# Patient Record
Sex: MALE | Race: WHITE | NOT HISPANIC OR LATINO | Employment: OTHER | URBAN - NONMETROPOLITAN AREA
[De-identification: names, ages, dates, MRNs, and addresses within clinical notes are randomized per-mention and may not be internally consistent; named-entity substitution may affect disease eponyms.]

---

## 2023-12-13 ENCOUNTER — OFFICE VISIT (OUTPATIENT)
Dept: ORTHOPEDIC SURGERY | Facility: CLINIC | Age: 63
End: 2023-12-13
Payer: MEDICARE

## 2023-12-13 ENCOUNTER — HOSPITAL ENCOUNTER (OUTPATIENT)
Dept: GENERAL RADIOLOGY | Facility: HOSPITAL | Age: 63
Discharge: HOME | End: 2023-12-13
Admitting: ORTHOPAEDIC SURGERY
Payer: MEDICARE

## 2023-12-13 VITALS
SYSTOLIC BLOOD PRESSURE: 104 MMHG | WEIGHT: 178 LBS | HEIGHT: 68 IN | HEART RATE: 56 BPM | DIASTOLIC BLOOD PRESSURE: 71 MMHG | BODY MASS INDEX: 26.98 KG/M2

## 2023-12-13 DIAGNOSIS — M24.542 CONTRACTURE OF FINGER JOINT, LEFT: Primary | ICD-10-CM

## 2023-12-13 DIAGNOSIS — M72.0 DUPUYTREN'S CONTRACTURE OF LEFT HAND: ICD-10-CM

## 2023-12-13 DIAGNOSIS — M24.542 CONTRACTURE OF FINGER JOINT, LEFT: ICD-10-CM

## 2023-12-13 PROCEDURE — 99204 OFFICE O/P NEW MOD 45 MIN: CPT | Performed by: ORTHOPAEDIC SURGERY

## 2023-12-13 PROCEDURE — 73130 X-RAY EXAM OF HAND: CPT

## 2023-12-13 RX ORDER — TRAZODONE HYDROCHLORIDE 100 MG/1
100 TABLET ORAL DAILY
COMMUNITY
Start: 2023-08-22

## 2023-12-13 RX ORDER — CARVEDILOL 6.25 MG/1
6.25 TABLET ORAL 2 TIMES DAILY
COMMUNITY

## 2023-12-13 RX ORDER — LOSARTAN POTASSIUM AND HYDROCHLOROTHIAZIDE 12.5; 1 MG/1; MG/1
TABLET ORAL
COMMUNITY

## 2023-12-13 RX ORDER — GABAPENTIN 100 MG/1
100 CAPSULE ORAL 3 TIMES DAILY
COMMUNITY

## 2023-12-13 RX ORDER — OMEPRAZOLE 20 MG/1
CAPSULE, DELAYED RELEASE ORAL
COMMUNITY
Start: 2023-11-01

## 2023-12-13 RX ORDER — LEVOTHYROXINE SODIUM 0.05 MG/1
50 TABLET ORAL DAILY
COMMUNITY
Start: 2023-08-22

## 2023-12-13 RX ORDER — ASPIRIN 81 MG/1
81 TABLET ORAL DAILY
COMMUNITY

## 2023-12-13 NOTE — H&P (VIEW-ONLY)
History and Physical      Patient: Iker Mayen  YOB: 1960  Date of Encounter: 12/13/2023      Chief Complaint:   Chief Complaint   Patient presents with    Left Hand - Contractions, Initial Evaluation     Left 5th finger           HPI:   Iker Mayen, 63 y.o. male, presents for evaluation of contracture of his left hand fifth finger.  He has been symptomatic for greater than 5 years.  He was treated at the [facility] with enzyme injection.  Reports that his contracture has returned.  He has difficulty with common daily activities because of the contracture.  Past medical history is unremarkable for  cardiovascular disease.        Active Problem List:  Patient Active Problem List   Diagnosis    Contracture of finger joint, left    Dupuytren's contracture of left hand           Past Medical History:  Past Medical History:   Diagnosis Date    Hypertension            Past Surgical History:  Past Surgical History:   Procedure Laterality Date    BACK SURGERY      CHOLECYSTECTOMY      HERNIA REPAIR             Family History:  History reviewed. No pertinent family history.        Social History:  Social History     Socioeconomic History    Marital status:    Tobacco Use    Smoking status: Former     Types: Cigarettes    Smokeless tobacco: Never   Vaping Use    Vaping Use: Never used   Substance and Sexual Activity    Alcohol use: Not Currently    Drug use: Never    Sexual activity: Defer     Body mass index is 27.06 kg/m².        Medications:  Current Outpatient Medications   Medication Sig Dispense Refill    aspirin 81 MG EC tablet Take 1 tablet by mouth Daily.      carvedilol (COREG) 6.25 MG tablet Take 1 tablet by mouth 2 (Two) Times a Day.      gabapentin (NEURONTIN) 100 MG capsule Take 1 capsule by mouth 3 (Three) Times a Day.      levothyroxine (SYNTHROID, LEVOTHROID) 50 MCG tablet Take 1 tablet by mouth Daily.      losartan-hydrochlorothiazide (HYZAAR) 100-12.5 MG per  tablet take 1 tablet by mouth 1 time each day      omeprazole (priLOSEC) 20 MG capsule       sertraline (ZOLOFT) 50 MG tablet Take 1 tablet by mouth Daily.      traZODone (DESYREL) 100 MG tablet Take 1 tablet by mouth Daily.       No current facility-administered medications for this visit.           Allergies:  Allergies   Allergen Reactions    Adhesive Tape Rash    Latex Unknown - Low Severity and Other (See Comments)     Latex tape, blisters on skin    Wound Dressing Adhesive Other (See Comments)           Review of Systems:   Review of Systems   Constitutional: Negative.  Negative for chills, fatigue and fever.   HENT: Negative.  Negative for congestion, facial swelling, mouth sores, sore throat, trouble swallowing and voice change.    Eyes: Negative.  Negative for pain, discharge and visual disturbance.   Respiratory: Negative.  Negative for apnea, cough, chest tightness, shortness of breath and wheezing.    Cardiovascular: Negative.  Negative for chest pain, palpitations and leg swelling.   Gastrointestinal: Negative.  Negative for abdominal distention, abdominal pain, anal bleeding, constipation, diarrhea, nausea and vomiting.   Endocrine: Negative.  Negative for cold intolerance, heat intolerance, polyphagia and polyuria.   Genitourinary: Negative.  Negative for difficulty urinating, dysuria, flank pain and hematuria.   Musculoskeletal:  Positive for arthralgias.   Skin: Negative.  Negative for color change, pallor, rash and wound.   Allergic/Immunologic: Negative.  Negative for environmental allergies, food allergies and immunocompromised state.   Neurological: Negative.  Negative for dizziness, tremors, seizures, syncope, facial asymmetry, speech difficulty, weakness, light-headedness, numbness and headaches.   Hematological: Negative.  Negative for adenopathy. Does not bruise/bleed easily.   Psychiatric/Behavioral: Negative.  Negative for behavioral problems, confusion, dysphoric mood, self-injury, sleep  "disturbance and suicidal ideas. The patient is not nervous/anxious.            Physical Exam:   Physical Exam  GENERAL: 63 y.o. male, alert and oriented X 3 in no acute distress.   Visit Vitals  /71   Pulse 56   Ht 172.7 cm (68\")   Wt 80.7 kg (178 lb)   BMI 27.06 kg/m²               Musculoskeletal Examination: Hand reveals 90 degree flexion contracture of the PIP joint with palpable cord extending into the palm.  Demonstrates normal sensation.    Demonstrates active flexion extension of all other fingers with no evidence of contracture.      Radiology/Labs:    XR Hand 3+ View Left    Result Date: 12/13/2023    Flexion at the proximal interphalangeal joint of the fifth digit but no acute fracture.   This report was finalized on 12/13/2023 9:35 AM by Dr. Surya Simon MD.           Radiographs hand obtained today and reviewed demonstrate 90 degree flexion contracture of the PIP joint of the fifth finger otherwise unremarkable.        Assessment & Plan:   63 y.o. male presents for 5-year history of contracture left fifth finger has received enzyme injection without improvement.  He wishes to treat with surgical solution we discussed options discussed benefits and risk including the risk of damage to the fifth finger. He accepts these risks and wishes to proceed with proposed partial palmar fasciectomy and excision of the Dupuytren's disease left fifth finger he is scheduled [facility].  He will discontinue aspirin.      ICD-10-CM ICD-9-CM   1. Contracture of finger joint, left  M24.542 718.44   2. Dupuytren's contracture of left hand  M72.0 728.6           Cc:   Tri Espitia DO              This document has been electronically signed by Ankush Alexander MD   December 17, 2023 19:26 EST                "

## 2023-12-13 NOTE — PROGRESS NOTES
History and Physical      Patient: Iker Mayen  YOB: 1960  Date of Encounter: 12/13/2023      Chief Complaint:   Chief Complaint   Patient presents with    Left Hand - Contractions, Initial Evaluation     Left 5th finger           HPI:   Iker Mayen, 63 y.o. male, presents for evaluation of contracture of his left hand fifth finger.  He has been symptomatic for greater than 5 years.  He was treated at the [facility] with enzyme injection.  Reports that his contracture has returned.  He has difficulty with common daily activities because of the contracture.  Past medical history is unremarkable for  cardiovascular disease.        Active Problem List:  Patient Active Problem List   Diagnosis    Contracture of finger joint, left    Dupuytren's contracture of left hand           Past Medical History:  Past Medical History:   Diagnosis Date    Hypertension            Past Surgical History:  Past Surgical History:   Procedure Laterality Date    BACK SURGERY      CHOLECYSTECTOMY      HERNIA REPAIR             Family History:  History reviewed. No pertinent family history.        Social History:  Social History     Socioeconomic History    Marital status:    Tobacco Use    Smoking status: Former     Types: Cigarettes    Smokeless tobacco: Never   Vaping Use    Vaping Use: Never used   Substance and Sexual Activity    Alcohol use: Not Currently    Drug use: Never    Sexual activity: Defer     Body mass index is 27.06 kg/m².        Medications:  Current Outpatient Medications   Medication Sig Dispense Refill    aspirin 81 MG EC tablet Take 1 tablet by mouth Daily.      carvedilol (COREG) 6.25 MG tablet Take 1 tablet by mouth 2 (Two) Times a Day.      gabapentin (NEURONTIN) 100 MG capsule Take 1 capsule by mouth 3 (Three) Times a Day.      levothyroxine (SYNTHROID, LEVOTHROID) 50 MCG tablet Take 1 tablet by mouth Daily.      losartan-hydrochlorothiazide (HYZAAR) 100-12.5 MG per  tablet take 1 tablet by mouth 1 time each day      omeprazole (priLOSEC) 20 MG capsule       sertraline (ZOLOFT) 50 MG tablet Take 1 tablet by mouth Daily.      traZODone (DESYREL) 100 MG tablet Take 1 tablet by mouth Daily.       No current facility-administered medications for this visit.           Allergies:  Allergies   Allergen Reactions    Adhesive Tape Rash    Latex Unknown - Low Severity and Other (See Comments)     Latex tape, blisters on skin    Wound Dressing Adhesive Other (See Comments)           Review of Systems:   Review of Systems   Constitutional: Negative.  Negative for chills, fatigue and fever.   HENT: Negative.  Negative for congestion, facial swelling, mouth sores, sore throat, trouble swallowing and voice change.    Eyes: Negative.  Negative for pain, discharge and visual disturbance.   Respiratory: Negative.  Negative for apnea, cough, chest tightness, shortness of breath and wheezing.    Cardiovascular: Negative.  Negative for chest pain, palpitations and leg swelling.   Gastrointestinal: Negative.  Negative for abdominal distention, abdominal pain, anal bleeding, constipation, diarrhea, nausea and vomiting.   Endocrine: Negative.  Negative for cold intolerance, heat intolerance, polyphagia and polyuria.   Genitourinary: Negative.  Negative for difficulty urinating, dysuria, flank pain and hematuria.   Musculoskeletal:  Positive for arthralgias.   Skin: Negative.  Negative for color change, pallor, rash and wound.   Allergic/Immunologic: Negative.  Negative for environmental allergies, food allergies and immunocompromised state.   Neurological: Negative.  Negative for dizziness, tremors, seizures, syncope, facial asymmetry, speech difficulty, weakness, light-headedness, numbness and headaches.   Hematological: Negative.  Negative for adenopathy. Does not bruise/bleed easily.   Psychiatric/Behavioral: Negative.  Negative for behavioral problems, confusion, dysphoric mood, self-injury, sleep  "disturbance and suicidal ideas. The patient is not nervous/anxious.            Physical Exam:   Physical Exam  GENERAL: 63 y.o. male, alert and oriented X 3 in no acute distress.   Visit Vitals  /71   Pulse 56   Ht 172.7 cm (68\")   Wt 80.7 kg (178 lb)   BMI 27.06 kg/m²               Musculoskeletal Examination: Hand reveals 90 degree flexion contracture of the PIP joint with palpable cord extending into the palm.  Demonstrates normal sensation.    Demonstrates active flexion extension of all other fingers with no evidence of contracture.      Radiology/Labs:    XR Hand 3+ View Left    Result Date: 12/13/2023    Flexion at the proximal interphalangeal joint of the fifth digit but no acute fracture.   This report was finalized on 12/13/2023 9:35 AM by Dr. Surya Simon MD.           Radiographs hand obtained today and reviewed demonstrate 90 degree flexion contracture of the PIP joint of the fifth finger otherwise unremarkable.        Assessment & Plan:   63 y.o. male presents for 5-year history of contracture left fifth finger has received enzyme injection without improvement.  He wishes to treat with surgical solution we discussed options discussed benefits and risk including the risk of damage to the fifth finger. He accepts these risks and wishes to proceed with proposed partial palmar fasciectomy and excision of the Dupuytren's disease left fifth finger he is scheduled [facility].  He will discontinue aspirin.      ICD-10-CM ICD-9-CM   1. Contracture of finger joint, left  M24.542 718.44   2. Dupuytren's contracture of left hand  M72.0 728.6           Cc:   Tri Espitia DO              This document has been electronically signed by Ankush Alexander MD   December 17, 2023 19:26 EST                "

## 2023-12-13 NOTE — PROGRESS NOTES
"New Patient Visit      Patient: Iker Mayen  YOB: 1960  Date of Encounter: 12/13/2023        Chief Complaint:   Chief Complaint   Patient presents with    Left Hand - Contractions, Initial Evaluation     Left 5th finger           HPI:   Iker Mayen, 63 y.o. male, referred by Tri Espitia DO presents        Active Problem List:  There is no problem list on file for this patient.          Past Medical History:  No past medical history on file.        Past Surgical History:  No past surgical history on file.        Family History:  No family history on file.      Social History:  Social History     Socioeconomic History    Marital status:      Body mass index is 27.06 kg/m².      Medications:  No current outpatient medications on file.     No current facility-administered medications for this visit.         Allergies:  No Known Allergies      Review of Systems:   Review of Systems      Physical Exam:   Physical Exam  GENERAL: 63 y.o. male, alert and oriented X 3 in no acute distress.   Visit Vitals  Ht 172.7 cm (68\")   Wt 80.7 kg (178 lb)   BMI 27.06 kg/m²       GENERAL APPEARANCE: Awake, alert & oriented, in no acute distress and well developed, well nourished.   PSYCH: Normal mood and affect  LUNGS: Breathing nonlabored, no wheezing  EYES: Sclera anicteric, pupils equal  CARDIOVASCULAR: Palpable pulses. Capillary refill less than 2 seconds  INTEGUMENTARY: Skin intact, co clubbing, cyanosis  NEUROLOGIC: Normal Sensation  MUSCULOSKELETAL:  Orthopedic Examination:          Radiology/Labs:     No radiology results for the last 30 days.        Radiographs          Assessment & Plan:   63 y.o. male presents      No diagnosis found.      Procedures      Cc:   Tri Espitia DO                This document has been electronically signed by Ankush Alexander MD   December 13, 2023 09:15 EST      "

## 2023-12-29 NOTE — DISCHARGE INSTRUCTIONS
1/04/23  ARRIVAL TIME PER DR OFFICE  TAKE the following medications the morning of surgery:  All heart or blood pressure medications    HOLD all diabetic medications the morning of surgery as ordered by physician.    Please discontinue all blood thinners and anticoagulants (except aspirin) prior to surgery as per your surgeon and cardiologist instructions.  Aspirin may be continued up to the day prior to surgery.     CHLORHEXIDINE CLOTHS GIVEN WITH INSTRUCTIONS AND FORM TO RETURN TO HOSPITAL, IF APPLICABLE.    General Instructions:  Do not eat or drink after midnight: includes water, mints, or gum. You may brush your teeth.  Dental appliances that are removable must be taken out day of surgery.  Do not smoke, chew tobacco, or drink alcohol.  Bring medications in original bottles, any inhalers and if applicable your C-PAP/BI-PAP machine.  Bring any papers given to you in the doctor's office.  Wear clean comfortable clothes and socks.  Do not wear contact lenses or make-up. Bring a case for your glasses if applicable.  Bring crutches or walker if applicable.  Leave all other valuables and jewelry at home.    If you were given a blood bank ID arm band remember to bring it with you the day of surgery.    Preventing a Surgical Site Infection:  Shower the night before surgery (unless instructed other wise) using a fresh bar of anti-bacterial soap (such as Dial) and clean washcloth. Dry with a clean towel and dress in clean clothing.  For 2 to 3 days before surgery, avoid shaving with a razor near where you will have surgery because the razor can irritate skin and make it easier to develop an infection. Ask your surgeon if you will be receiving antibiotics prior to surgery.  Make sure you, your family, and all healthcare providers clear their hands with soap and water or an alcohol-based hand  before caring for you or your wound.  If at all possible, quit smoking as many days before surgery as you can.    Day of  surgery:  Upon arrival, a Pre-op nurse and Anesthesiologist will review your health history, obtain vital signs, and answer questions you may have. The only belongings needed at this time will be your home medications and if applicable your C-PAP/BI-PAP machine. If you are staying overnight your family can leave the rest of your belongings in the car and bring them to your room later. A Pre-op nurse will start an IV and you may receive medication in preparation for surgery, including something to help you relax. Your family will be able to see you in the Pre-op area. While you are in surgery your family should notify the waiting room  if they leave the waiting room area and provide a contact phone number.    Please be aware that surgery does come with discomfort. We want to make every effort to control your discomfort so please discuss any uncontrolled symptoms with your nurse. Your doctor will most likely have prescribed pain medications.    If you are going home after surgery you will receive individualized written care instructions before being discharged. A responsible adult must drive you to and from the hospital on the day of surgery and stay with you for 24 hours.    If you are staying overnight following surgery, you will be transported to your hospital room following the recovery period.  [facility] has all private rooms.    If you have any questions please call Pre-Admission Testing at [phone].  Deductibles and co-payments are collected on the day of service. Please be prepared to pay the required co-pay, deductible or deposit on the day of service as defined by your plan.    A RESPONSIBLE PERSON MUST REMAIN IN THE WAITING ROOM DURING YOUR PROCEDURE AND A RESPONSIBLE  MUST BE AVAILABLE UPON YOUR DISCHARGE.

## 2024-01-02 ENCOUNTER — PRE-ADMISSION TESTING (OUTPATIENT)
Dept: PREADMISSION TESTING | Facility: HOSPITAL | Age: 64
End: 2024-01-02
Payer: MEDICARE

## 2024-01-02 DIAGNOSIS — M24.542 CONTRACTURE OF FINGER JOINT, LEFT: ICD-10-CM

## 2024-01-02 DIAGNOSIS — M72.0 DUPUYTREN'S CONTRACTURE OF LEFT HAND: ICD-10-CM

## 2024-01-02 LAB
ANION GAP SERPL CALCULATED.3IONS-SCNC: 8.3 MMOL/L (ref 5–15)
BUN SERPL-MCNC: 11 MG/DL (ref 8–23)
BUN/CREAT SERPL: 11.3 (ref 7–25)
CALCIUM SPEC-SCNC: 9.4 MG/DL (ref 8.6–10.5)
CHLORIDE SERPL-SCNC: 103 MMOL/L (ref 98–107)
CO2 SERPL-SCNC: 27.7 MMOL/L (ref 22–29)
CREAT SERPL-MCNC: 0.97 MG/DL (ref 0.76–1.27)
DEPRECATED RDW RBC AUTO: 45.4 FL (ref 37–54)
EGFRCR SERPLBLD CKD-EPI 2021: 87.7 ML/MIN/1.73
ERYTHROCYTE [DISTWIDTH] IN BLOOD BY AUTOMATED COUNT: 13 % (ref 12.3–15.4)
GLUCOSE SERPL-MCNC: 101 MG/DL (ref 65–99)
HCT VFR BLD AUTO: 40.6 % (ref 37.5–51)
HGB BLD-MCNC: 12.6 G/DL (ref 13–17.7)
MCH RBC QN AUTO: 29.8 PG (ref 26.6–33)
MCHC RBC AUTO-ENTMCNC: 31 G/DL (ref 31.5–35.7)
MCV RBC AUTO: 96 FL (ref 79–97)
PLATELET # BLD AUTO: 194 10*3/MM3 (ref 140–450)
PMV BLD AUTO: 12.6 FL (ref 6–12)
POTASSIUM SERPL-SCNC: 4.6 MMOL/L (ref 3.5–5.2)
RBC # BLD AUTO: 4.23 10*6/MM3 (ref 4.14–5.8)
SODIUM SERPL-SCNC: 139 MMOL/L (ref 136–145)
WBC NRBC COR # BLD AUTO: 8.45 10*3/MM3 (ref 3.4–10.8)

## 2024-01-02 PROCEDURE — 85027 COMPLETE CBC AUTOMATED: CPT

## 2024-01-02 PROCEDURE — 80048 BASIC METABOLIC PNL TOTAL CA: CPT

## 2024-01-02 PROCEDURE — 36415 COLL VENOUS BLD VENIPUNCTURE: CPT

## 2024-01-03 ENCOUNTER — TELEPHONE (OUTPATIENT)
Dept: ORTHOPEDIC SURGERY | Facility: CLINIC | Age: 64
End: 2024-01-03
Payer: MEDICARE

## 2024-01-03 NOTE — TELEPHONE ENCOUNTER
Provider: CAROLINE    Caller: AURELIA    Relationship to Patient: SELF    Pharmacy:     Phone Number: [phone]    Reason for Call: PT CALLING TO CONFIRM HIS SX TIME TOMORROW 1/4/23 - ATTEMPTED TO [facility]

## 2024-01-04 ENCOUNTER — HOSPITAL ENCOUNTER (OUTPATIENT)
Facility: HOSPITAL | Age: 64
Setting detail: HOSPITAL OUTPATIENT SURGERY
Discharge: HOME | End: 2024-01-04
Attending: ORTHOPAEDIC SURGERY | Admitting: ORTHOPAEDIC SURGERY
Payer: MEDICARE

## 2024-01-04 ENCOUNTER — ANESTHESIA EVENT (OUTPATIENT)
Dept: PERIOP | Facility: HOSPITAL | Age: 64
End: 2024-01-04
Payer: MEDICARE

## 2024-01-04 ENCOUNTER — ANESTHESIA (OUTPATIENT)
Dept: PERIOP | Facility: HOSPITAL | Age: 64
End: 2024-01-04
Payer: MEDICARE

## 2024-01-04 VITALS
OXYGEN SATURATION: 97 % | HEIGHT: 68 IN | TEMPERATURE: 98.1 F | HEART RATE: 50 BPM | SYSTOLIC BLOOD PRESSURE: 115 MMHG | DIASTOLIC BLOOD PRESSURE: 51 MMHG | RESPIRATION RATE: 18 BRPM | BODY MASS INDEX: 27.48 KG/M2 | WEIGHT: 181.3 LBS

## 2024-01-04 DIAGNOSIS — M24.542 CONTRACTURE OF FINGER JOINT, LEFT: ICD-10-CM

## 2024-01-04 DIAGNOSIS — M72.0 DUPUYTREN'S CONTRACTURE OF LEFT HAND: ICD-10-CM

## 2024-01-04 PROCEDURE — 25810000003 LACTATED RINGERS PER 1000 ML: Performed by: NURSE ANESTHETIST, CERTIFIED REGISTERED

## 2024-01-04 PROCEDURE — 25810000003 LACTATED RINGERS PER 1000 ML: Performed by: ANESTHESIOLOGY

## 2024-01-04 PROCEDURE — 25010000002 FENTANYL CITRATE (PF) 50 MCG/ML SOLUTION: Performed by: NURSE ANESTHETIST, CERTIFIED REGISTERED

## 2024-01-04 PROCEDURE — 25010000002 ONDANSETRON PER 1 MG: Performed by: NURSE ANESTHETIST, CERTIFIED REGISTERED

## 2024-01-04 PROCEDURE — 25010000002 KETOROLAC TROMETHAMINE PER 15 MG: Performed by: NURSE ANESTHETIST, CERTIFIED REGISTERED

## 2024-01-04 PROCEDURE — 25010000002 LIDOCAINE 1 % SOLUTION: Performed by: ORTHOPAEDIC SURGERY

## 2024-01-04 PROCEDURE — 25010000002 MIDAZOLAM PER 1 MG: Performed by: NURSE ANESTHETIST, CERTIFIED REGISTERED

## 2024-01-04 PROCEDURE — 26123 RELEASE PALM CONTRACTURE: CPT | Performed by: ORTHOPAEDIC SURGERY

## 2024-01-04 PROCEDURE — 25010000002 PROPOFOL 200 MG/20ML EMULSION: Performed by: NURSE ANESTHETIST, CERTIFIED REGISTERED

## 2024-01-04 PROCEDURE — 25010000002 BUPIVACAINE 0.5 % SOLUTION: Performed by: ORTHOPAEDIC SURGERY

## 2024-01-04 RX ORDER — FAMOTIDINE 10 MG/ML
INJECTION, SOLUTION INTRAVENOUS AS NEEDED
Status: DISCONTINUED | OUTPATIENT
Start: 2024-01-04 | End: 2024-01-04 | Stop reason: SURG

## 2024-01-04 RX ORDER — PROPOFOL 10 MG/ML
INJECTION, EMULSION INTRAVENOUS AS NEEDED
Status: DISCONTINUED | OUTPATIENT
Start: 2024-01-04 | End: 2024-01-04 | Stop reason: SURG

## 2024-01-04 RX ORDER — MEPERIDINE HYDROCHLORIDE 25 MG/ML
12.5 INJECTION INTRAMUSCULAR; INTRAVENOUS; SUBCUTANEOUS
Status: DISCONTINUED | OUTPATIENT
Start: 2024-01-04 | End: 2024-01-04 | Stop reason: HOSPADM

## 2024-01-04 RX ORDER — SODIUM CHLORIDE, SODIUM LACTATE, POTASSIUM CHLORIDE, CALCIUM CHLORIDE 600; 310; 30; 20 MG/100ML; MG/100ML; MG/100ML; MG/100ML
INJECTION, SOLUTION INTRAVENOUS CONTINUOUS PRN
Status: DISCONTINUED | OUTPATIENT
Start: 2024-01-04 | End: 2024-01-04 | Stop reason: SURG

## 2024-01-04 RX ORDER — FENTANYL CITRATE 50 UG/ML
INJECTION, SOLUTION INTRAMUSCULAR; INTRAVENOUS AS NEEDED
Status: DISCONTINUED | OUTPATIENT
Start: 2024-01-04 | End: 2024-01-04 | Stop reason: SURG

## 2024-01-04 RX ORDER — BUPIVACAINE HYDROCHLORIDE 5 MG/ML
INJECTION, SOLUTION PERINEURAL AS NEEDED
Status: DISCONTINUED | OUTPATIENT
Start: 2024-01-04 | End: 2024-01-04 | Stop reason: HOSPADM

## 2024-01-04 RX ORDER — FENTANYL CITRATE 50 UG/ML
50 INJECTION, SOLUTION INTRAMUSCULAR; INTRAVENOUS
Status: DISCONTINUED | OUTPATIENT
Start: 2024-01-04 | End: 2024-01-04 | Stop reason: HOSPADM

## 2024-01-04 RX ORDER — SODIUM CHLORIDE, SODIUM LACTATE, POTASSIUM CHLORIDE, CALCIUM CHLORIDE 600; 310; 30; 20 MG/100ML; MG/100ML; MG/100ML; MG/100ML
125 INJECTION, SOLUTION INTRAVENOUS ONCE
Status: COMPLETED | OUTPATIENT
Start: 2024-01-04 | End: 2024-01-04

## 2024-01-04 RX ORDER — LIDOCAINE HYDROCHLORIDE 10 MG/ML
INJECTION, SOLUTION INFILTRATION; PERINEURAL AS NEEDED
Status: DISCONTINUED | OUTPATIENT
Start: 2024-01-04 | End: 2024-01-04 | Stop reason: HOSPADM

## 2024-01-04 RX ORDER — ONDANSETRON 2 MG/ML
4 INJECTION INTRAMUSCULAR; INTRAVENOUS AS NEEDED
Status: DISCONTINUED | OUTPATIENT
Start: 2024-01-04 | End: 2024-01-04 | Stop reason: HOSPADM

## 2024-01-04 RX ORDER — OXYCODONE HYDROCHLORIDE AND ACETAMINOPHEN 5; 325 MG/1; MG/1
1 TABLET ORAL ONCE AS NEEDED
Status: DISCONTINUED | OUTPATIENT
Start: 2024-01-04 | End: 2024-01-04 | Stop reason: HOSPADM

## 2024-01-04 RX ORDER — SODIUM CHLORIDE 0.9 % (FLUSH) 0.9 %
10 SYRINGE (ML) INJECTION EVERY 12 HOURS SCHEDULED
Status: DISCONTINUED | OUTPATIENT
Start: 2024-01-04 | End: 2024-01-04 | Stop reason: HOSPADM

## 2024-01-04 RX ORDER — MIDAZOLAM HYDROCHLORIDE 1 MG/ML
INJECTION INTRAMUSCULAR; INTRAVENOUS AS NEEDED
Status: DISCONTINUED | OUTPATIENT
Start: 2024-01-04 | End: 2024-01-04 | Stop reason: SURG

## 2024-01-04 RX ORDER — KETOROLAC TROMETHAMINE 30 MG/ML
INJECTION, SOLUTION INTRAMUSCULAR; INTRAVENOUS AS NEEDED
Status: DISCONTINUED | OUTPATIENT
Start: 2024-01-04 | End: 2024-01-04 | Stop reason: SURG

## 2024-01-04 RX ORDER — IPRATROPIUM BROMIDE AND ALBUTEROL SULFATE 2.5; .5 MG/3ML; MG/3ML
3 SOLUTION RESPIRATORY (INHALATION) ONCE AS NEEDED
Status: DISCONTINUED | OUTPATIENT
Start: 2024-01-04 | End: 2024-01-04 | Stop reason: HOSPADM

## 2024-01-04 RX ORDER — ONDANSETRON 2 MG/ML
INJECTION INTRAMUSCULAR; INTRAVENOUS AS NEEDED
Status: DISCONTINUED | OUTPATIENT
Start: 2024-01-04 | End: 2024-01-04 | Stop reason: SURG

## 2024-01-04 RX ORDER — MIDAZOLAM HYDROCHLORIDE 1 MG/ML
1 INJECTION INTRAMUSCULAR; INTRAVENOUS
Status: DISCONTINUED | OUTPATIENT
Start: 2024-01-04 | End: 2024-01-04 | Stop reason: HOSPADM

## 2024-01-04 RX ORDER — SODIUM CHLORIDE 0.9 % (FLUSH) 0.9 %
10 SYRINGE (ML) INJECTION AS NEEDED
Status: DISCONTINUED | OUTPATIENT
Start: 2024-01-04 | End: 2024-01-04 | Stop reason: HOSPADM

## 2024-01-04 RX ORDER — SODIUM CHLORIDE 9 MG/ML
40 INJECTION, SOLUTION INTRAVENOUS AS NEEDED
Status: DISCONTINUED | OUTPATIENT
Start: 2024-01-04 | End: 2024-01-04 | Stop reason: HOSPADM

## 2024-01-04 RX ORDER — OXYCODONE HYDROCHLORIDE AND ACETAMINOPHEN 5; 325 MG/1; MG/1
1 TABLET ORAL EVERY 4 HOURS PRN
Qty: 20 TABLET | Refills: 0 | Status: SHIPPED | OUTPATIENT
Start: 2024-01-04

## 2024-01-04 RX ORDER — MAGNESIUM HYDROXIDE 1200 MG/15ML
LIQUID ORAL AS NEEDED
Status: DISCONTINUED | OUTPATIENT
Start: 2024-01-04 | End: 2024-01-04 | Stop reason: HOSPADM

## 2024-01-04 RX ORDER — SODIUM CHLORIDE, SODIUM LACTATE, POTASSIUM CHLORIDE, CALCIUM CHLORIDE 600; 310; 30; 20 MG/100ML; MG/100ML; MG/100ML; MG/100ML
100 INJECTION, SOLUTION INTRAVENOUS ONCE AS NEEDED
Status: DISCONTINUED | OUTPATIENT
Start: 2024-01-04 | End: 2024-01-04 | Stop reason: HOSPADM

## 2024-01-04 RX ADMIN — ONDANSETRON 4 MG: 2 INJECTION INTRAMUSCULAR; INTRAVENOUS at 08:27

## 2024-01-04 RX ADMIN — PROPOFOL 30 MG: 10 INJECTION, EMULSION INTRAVENOUS at 09:46

## 2024-01-04 RX ADMIN — MIDAZOLAM HYDROCHLORIDE 2 MG: 1 INJECTION, SOLUTION INTRAMUSCULAR; INTRAVENOUS at 08:27

## 2024-01-04 RX ADMIN — PROPOFOL 30 MG: 10 INJECTION, EMULSION INTRAVENOUS at 09:22

## 2024-01-04 RX ADMIN — PROPOFOL 30 MG: 10 INJECTION, EMULSION INTRAVENOUS at 09:38

## 2024-01-04 RX ADMIN — PROPOFOL 30 MG: 10 INJECTION, EMULSION INTRAVENOUS at 09:54

## 2024-01-04 RX ADMIN — PROPOFOL 30 MG: 10 INJECTION, EMULSION INTRAVENOUS at 09:50

## 2024-01-04 RX ADMIN — PROPOFOL 50 MG: 10 INJECTION, EMULSION INTRAVENOUS at 08:45

## 2024-01-04 RX ADMIN — SODIUM CHLORIDE, POTASSIUM CHLORIDE, SODIUM LACTATE AND CALCIUM CHLORIDE: 600; 310; 30; 20 INJECTION, SOLUTION INTRAVENOUS at 10:07

## 2024-01-04 RX ADMIN — PROPOFOL 30 MG: 10 INJECTION, EMULSION INTRAVENOUS at 11:02

## 2024-01-04 RX ADMIN — PROPOFOL 30 MG: 10 INJECTION, EMULSION INTRAVENOUS at 08:41

## 2024-01-04 RX ADMIN — PROPOFOL 30 MG: 10 INJECTION, EMULSION INTRAVENOUS at 10:10

## 2024-01-04 RX ADMIN — PROPOFOL 30 MG: 10 INJECTION, EMULSION INTRAVENOUS at 10:30

## 2024-01-04 RX ADMIN — PROPOFOL 30 MG: 10 INJECTION, EMULSION INTRAVENOUS at 10:58

## 2024-01-04 RX ADMIN — PROPOFOL 30 MG: 10 INJECTION, EMULSION INTRAVENOUS at 10:14

## 2024-01-04 RX ADMIN — PROPOFOL 30 MG: 10 INJECTION, EMULSION INTRAVENOUS at 10:34

## 2024-01-04 RX ADMIN — PROPOFOL 30 MG: 10 INJECTION, EMULSION INTRAVENOUS at 10:50

## 2024-01-04 RX ADMIN — PROPOFOL 30 MG: 10 INJECTION, EMULSION INTRAVENOUS at 10:38

## 2024-01-04 RX ADMIN — SODIUM CHLORIDE, POTASSIUM CHLORIDE, SODIUM LACTATE AND CALCIUM CHLORIDE 125 ML/HR: 600; 310; 30; 20 INJECTION, SOLUTION INTRAVENOUS at 08:09

## 2024-01-04 RX ADMIN — PROPOFOL 30 MG: 10 INJECTION, EMULSION INTRAVENOUS at 09:14

## 2024-01-04 RX ADMIN — SODIUM CHLORIDE, POTASSIUM CHLORIDE, SODIUM LACTATE AND CALCIUM CHLORIDE: 600; 310; 30; 20 INJECTION, SOLUTION INTRAVENOUS at 08:27

## 2024-01-04 RX ADMIN — PROPOFOL 30 MG: 10 INJECTION, EMULSION INTRAVENOUS at 09:30

## 2024-01-04 RX ADMIN — PROPOFOL 30 MG: 10 INJECTION, EMULSION INTRAVENOUS at 09:34

## 2024-01-04 RX ADMIN — PROPOFOL 30 MG: 10 INJECTION, EMULSION INTRAVENOUS at 10:02

## 2024-01-04 RX ADMIN — FENTANYL CITRATE 100 MCG: 50 INJECTION INTRAMUSCULAR; INTRAVENOUS at 08:27

## 2024-01-04 RX ADMIN — KETOROLAC TROMETHAMINE 30 MG: 30 INJECTION, SOLUTION INTRAMUSCULAR; INTRAVENOUS at 08:34

## 2024-01-04 RX ADMIN — FAMOTIDINE 20 MG: 10 INJECTION, SOLUTION INTRAVENOUS at 08:27

## 2024-01-04 RX ADMIN — PROPOFOL 30 MG: 10 INJECTION, EMULSION INTRAVENOUS at 10:22

## 2024-01-04 RX ADMIN — PROPOFOL 30 MG: 10 INJECTION, EMULSION INTRAVENOUS at 10:42

## 2024-01-04 RX ADMIN — PROPOFOL 30 MG: 10 INJECTION, EMULSION INTRAVENOUS at 10:06

## 2024-01-04 RX ADMIN — PROPOFOL 30 MG: 10 INJECTION, EMULSION INTRAVENOUS at 09:26

## 2024-01-04 RX ADMIN — PROPOFOL 30 MG: 10 INJECTION, EMULSION INTRAVENOUS at 10:26

## 2024-01-04 RX ADMIN — MIDAZOLAM HYDROCHLORIDE 2 MG: 1 INJECTION, SOLUTION INTRAMUSCULAR; INTRAVENOUS at 08:34

## 2024-01-04 RX ADMIN — PROPOFOL 30 MG: 10 INJECTION, EMULSION INTRAVENOUS at 11:06

## 2024-01-04 RX ADMIN — PROPOFOL 30 MG: 10 INJECTION, EMULSION INTRAVENOUS at 09:58

## 2024-01-04 RX ADMIN — PROPOFOL 30 MG: 10 INJECTION, EMULSION INTRAVENOUS at 10:18

## 2024-01-04 RX ADMIN — PROPOFOL 120 MG: 10 INJECTION, EMULSION INTRAVENOUS at 09:10

## 2024-01-04 RX ADMIN — PROPOFOL 30 MG: 10 INJECTION, EMULSION INTRAVENOUS at 10:46

## 2024-01-04 RX ADMIN — PROPOFOL 30 MG: 10 INJECTION, EMULSION INTRAVENOUS at 09:42

## 2024-01-04 RX ADMIN — PROPOFOL 30 MG: 10 INJECTION, EMULSION INTRAVENOUS at 09:18

## 2024-01-04 RX ADMIN — PROPOFOL 30 MG: 10 INJECTION, EMULSION INTRAVENOUS at 10:54

## 2024-01-04 NOTE — ANESTHESIA PREPROCEDURE EVALUATION
Anesthesia Evaluation     no history of anesthetic complications:   NPO Solid Status: > 8 hours  NPO Liquid Status: > 8 hours           Airway   Mallampati: I  TM distance: >3 FB  Neck ROM: full  No difficulty expected  Dental - normal exam     Pulmonary - normal exam   Cardiovascular - normal exam    (+) hypertension      Neuro/Psych  GI/Hepatic/Renal/Endo    (+) GERD, thyroid problem     Musculoskeletal     Abdominal  - normal exam    Bowel sounds: normal.   Substance History      OB/GYN          Other   arthritis,                   Anesthesia Plan    ASA 2     general     intravenous induction     Anesthetic plan, risks, benefits, and alternatives have been provided, discussed and informed consent has been obtained with: patient.      CODE STATUS:

## 2024-01-04 NOTE — OP NOTE
DUPUYTREN CONTRACTURE RELEASE  Procedure Note    Iker Mayen  1/4/2024    Pre-op Diagnosis:   Contracture of finger joint, left [M24.542]  Dupuytren's contracture of left hand [M72.0]    Post-op Diagnosis:     Post-Op Diagnosis Codes:     * Contracture of finger joint, left [M24.542]     * Dupuytren's contracture of left hand [M72.0]           Procedure(s):  PARTIAL PALMAR FASCIECTOMY LEFT HAND FIFTH FINGER    Surgeon(s):  Ankush Alexander MD    Anesthesia: General    Operative technique: With patient in the operating theatre under general IV sedation with left hand and arm sterilely prepped and draped in usual manner with tourniquet applied.  Hand was marked for Kerry incision.  Skin was infiltrated with 4 cc of 0.5 Marcaine.  Kerry incision was then created with flaps created medially and laterally with stay sutures positioned securing the flaps bluntly dissecting proximally ulnar branch digital nerve was identified and then observed to cross to the radial side.  The nerve was freed with both sharp and blunt dissection ulnar branch of digital nerve was traced as it traveled to the radial side of the fifth finger proximally and was then identified to cross back to the ulnar aspect.  Diseased fascia which included the cord and spiral ligament with natatory ligament were all released and beginning proximally and carrying the excision distally to the proximal aspect of the middle phalanx.  With diseased palmar fascia and cord excised flexion contracture of the fifth finger persisted approximately 40 degrees.  The flexor tendons were identified and with the MCP joint and mild flexion of the flexion contracture of the PIP joint persisted.  Avsola PIP joint gently released with the proximal aspect of the volar plate and additional 10 degrees of extension was achieved.  It was again irrigated with the tourniquet deflated hemostasis obtained the digital nerve on either side was confirmed to be intact.  The  wound was closed in a single layer 3-0 nylon vertical mattress suture sterile dressing was applied with's splint with the fifth finger in extension is much as possible.  He was lightened from anesthetic taken recovery in stable condition.    Staff:   Circulator: Tiffanie Taylor RN  Scrub Person: Nelda Scales LPN; Blank Elizalde    Estimated Blood Loss: none    Specimens:   none               Implants/Grafts: none      Drains: None    Complications: none    Tourniquet time: 105 min                      Ankush Alexander MD     Date: 1/4/2024  Time: 11:19 EST    Cc: Tri Espitia DO

## 2024-01-04 NOTE — ANESTHESIA POSTPROCEDURE EVALUATION
Patient: Iker Mayen    Procedure Summary       Date: 01/04/24 Room / Location:  [facility] OR 03 /  [facility]    Anesthesia Start: 0827 Anesthesia Stop: 1116    Procedure: PARTIAL PALMAR FASCIECTOMY LEFT HAND FIFTH FINGER (Left: Hand) Diagnosis:       Contracture of finger joint, left      Dupuytren's contracture of left hand      (Contracture of finger joint, left [M24.542])      (Dupuytren's contracture of left hand [M72.0])    Surgeons: Ankush Alexander MD Provider: Giovanni Anders MD    Anesthesia Type: general ASA Status: 2            Anesthesia Type: general    Vitals  Vitals Value Taken Time   /59 01/04/24 1132   Temp 98 °F (36.7 °C) 01/04/24 1117   Pulse 58 01/04/24 1133   Resp 20 01/04/24 1132   SpO2 97 % 01/04/24 1133   Vitals shown include unfiled device data.        Post Anesthesia Care and Evaluation    Patient location during evaluation: PHASE II  Patient participation: complete - patient participated  Level of consciousness: awake and alert  Pain score: 1  Pain management: adequate    Airway patency: patent  Anesthetic complications: No anesthetic complications  PONV Status: controlled  Cardiovascular status: acceptable  Respiratory status: acceptable and room air  Hydration status: euvolemic  No anesthesia care post op

## 2024-01-22 ENCOUNTER — OFFICE VISIT (OUTPATIENT)
Dept: ORTHOPEDIC SURGERY | Facility: CLINIC | Age: 64
End: 2024-01-22
Payer: MEDICARE

## 2024-01-22 VITALS — WEIGHT: 181 LBS | BODY MASS INDEX: 27.43 KG/M2 | HEIGHT: 68 IN

## 2024-01-22 DIAGNOSIS — Z09 POSTOP CHECK: ICD-10-CM

## 2024-01-22 DIAGNOSIS — M24.542 CONTRACTURE OF FINGER JOINT, LEFT: Primary | ICD-10-CM

## 2024-01-22 DIAGNOSIS — M72.0 DUPUYTREN'S CONTRACTURE OF LEFT HAND: ICD-10-CM

## 2024-01-22 PROCEDURE — 99024 POSTOP FOLLOW-UP VISIT: CPT | Performed by: ORTHOPAEDIC SURGERY

## 2024-01-22 NOTE — PROGRESS NOTES
Postoperative Follow-up          Patient: Iker Mayen  YOB: 1960  Date of Encounter: 01/22/2024      Chief Complaint:   Chief Complaint   Patient presents with    Left Hand - Post-op     01/04/24 (2w 4d) Ankush Alexander MD  Partial Palmar Fasciectomy Left Hand Fifth Finger - Left                HPI:  Iker Mayen, 63 y.o. male returns in postoperative follow-up Dupuytren's release left hand fifth finger and palm.  He is 8 days postop and he has removed his sutures was unable to return for follow-up as scheduled because of weather conditions.        Medical History:  Patient Active Problem List   Diagnosis   (none) - all problems resolved or deleted     Past Medical History:   Diagnosis Date    Arthritis     Diabetes mellitus     Prior to weight loss    Disease of thyroid gland     GERD (gastroesophageal reflux disease)     Herniation of lumbar intervertebral disc with compression of cauda equina     Hypertension     Partial paralysis of both lower limbs     sides of legs and feet           Surgical History:  Past Surgical History:   Procedure Laterality Date    ABDOMINAL SURGERY      BACK SURGERY      X4    CHOLECYSTECTOMY      COLONOSCOPY      DUPUYTREN CONTRACTURE RELEASE Left 1/4/2024    Procedure: PARTIAL PALMAR FASCIECTOMY LEFT HAND FIFTH FINGER;  Surgeon: Ankush Alexander MD;  Location: [facility];  Service: Orthopedics;  Laterality: Left;    ENDOSCOPY      HERNIA REPAIR      REBEKAH-EN-Y      TONSILLECTOMY             Examination:  Examination reveals residual flexion contracture approximately 30 degrees of the PIP joint.  1.5 cm midportion incision which is partially open with no surrounding erythema or drainage.        Assessment & Plan:  63 y.o. male presents in follow-up 18 days status post Dupuytren's release left hand with residual flexion contracture due to flexion deformity preop.  He was initially placed in splint which he is removed he is referred to physical  therapy for aggressive extension exercises and we have also requested placement of dynamic extension splint.  With opening in his incision we will schedule follow-up in approximately 1 week.       Diagnosis Plan   1. Contracture of finger joint, left  Ambulatory Referral to Occupational Therapy      2. Postop check        3. Dupuytren's contracture of left hand  Ambulatory Referral to Occupational Therapy              Cc:  Tri Espitia,               This document has been electronically signed by Ankush Alexander MD   January 22, 2024 16:24 EST

## 2024-02-29 ENCOUNTER — TELEPHONE (OUTPATIENT)
Dept: ORTHOPEDIC SURGERY | Facility: CLINIC | Age: 64
End: 2024-02-29
Payer: MEDICARE

## 2024-02-29 NOTE — TELEPHONE ENCOUNTER
Provider: CAROLINE    Caller: AURELIA    Relationship to Patient: SELF    Pharmacy:     Phone Number: [phone]    Reason for Call: PT CALLING TO SPEAK TO LAURENCE - PLEASE RETURN CALL

## 2024-09-26 ENCOUNTER — OFFICE VISIT (OUTPATIENT)
Dept: SURGERY | Facility: CLINIC | Age: 64
End: 2024-09-26
Payer: MEDICARE

## 2024-09-26 VITALS
WEIGHT: 182 LBS | SYSTOLIC BLOOD PRESSURE: 116 MMHG | DIASTOLIC BLOOD PRESSURE: 68 MMHG | HEART RATE: 60 BPM | HEIGHT: 68 IN | BODY MASS INDEX: 27.58 KG/M2

## 2024-09-26 DIAGNOSIS — D64.9 ANEMIA, UNSPECIFIED TYPE: Primary | ICD-10-CM

## 2024-09-26 PROCEDURE — 1160F RVW MEDS BY RX/DR IN RCRD: CPT | Performed by: SURGERY

## 2024-09-26 PROCEDURE — 1159F MED LIST DOCD IN RCRD: CPT | Performed by: SURGERY

## 2024-09-26 PROCEDURE — 99204 OFFICE O/P NEW MOD 45 MIN: CPT | Performed by: SURGERY

## 2024-09-26 RX ORDER — SODIUM CHLORIDE 0.9 % (FLUSH) 0.9 %
10 SYRINGE (ML) INJECTION AS NEEDED
OUTPATIENT
Start: 2024-09-26

## 2024-09-26 RX ORDER — POLYETHYLENE GLYCOL 3350 17 G/17G
17 POWDER, FOR SOLUTION ORAL DAILY
Qty: 1 EACH | Refills: 0 | Status: SHIPPED | OUTPATIENT
Start: 2024-09-26

## 2024-09-26 RX ORDER — SODIUM CHLORIDE 0.9 % (FLUSH) 0.9 %
10 SYRINGE (ML) INJECTION EVERY 12 HOURS SCHEDULED
OUTPATIENT
Start: 2024-09-26

## 2024-09-26 RX ORDER — FERROUS SULFATE 325(65) MG
325 TABLET, DELAYED RELEASE (ENTERIC COATED) ORAL DAILY
COMMUNITY
Start: 2024-09-13

## 2024-09-26 RX ORDER — SODIUM CHLORIDE 9 MG/ML
40 INJECTION, SOLUTION INTRAVENOUS AS NEEDED
OUTPATIENT
Start: 2024-09-26

## 2024-09-26 RX ORDER — BISACODYL 5 MG/1
5 TABLET, DELAYED RELEASE ORAL DAILY PRN
Qty: 8 TABLET | Refills: 0 | Status: SHIPPED | OUTPATIENT
Start: 2024-09-26

## 2024-12-16 ENCOUNTER — TELEPHONE (OUTPATIENT)
Dept: SURGERY | Facility: CLINIC | Age: 64
End: 2024-12-16
Payer: MEDICARE

## 2024-12-16 NOTE — TELEPHONE ENCOUNTER
You are scheduled for a colonoscopy with Dr. Jonn Jenkins on Dec 20 2024 at 1:00.   Dr. Jenkins's colonoscopy prep is a 2 day regimen.    Day 1: Clear liquid diet (items below) all day from the time you wake up until midnight. Between 1-3 pm Take 4 Dulcolax tabs with 8 oz of liquid.     Soft Drink Mt. Dew, Sprite, Ginger-Sakina (Yellow to clear in color)   Broth Beef or Chicken   Jell-O No ORANGE, RED or PURPLE    Gatorade No ORANGE,RED or PURPLE    Popsicles No ORANGE, RED or PURPLE    Coffee/Tea Black ONLY (no cream or sugar for tea or coffee)   Water Tap or Bottled   Juice Apple or White Grape       Day 2:  Clear liquid diet (items below) all day from the time you wake up until midnight. Between 1-3 pm Take 4 Dulcolax tabs with 8 oz of liquid.   At 4 pm Mix 32 oz of Gatorade Zero (No RED,ORANGE,PURPLE) with a 238 gram bottle of Miralax (store brand is fine). Once thoroughly mixed, drink entire contents within 2 hours.  Follow up prep by drinking 32 oz of plain water.     Soft Drink Mt. Dew, Sprite, Ginger-Sakina (Yellow to clear in color)   Broth Beef or Chicken   Jell-O No ORANGE, RED or PURPLE    Gatorade No ORANGE,RED or PURPLE    Popsicles No ORANGE, RED or PURPLE    Coffee/Tea Black ONLY (no cream or sugar for tea or coffee)   Water Tap or Bottled   Juice Apple or White Grape        All patients are to be NPO (no food or drink) after midnight the night before surgery.     All patients must have a  accompany them on the day of surgery. That person is REQUIRED to stay here on the hospital campus during your surgery! They cannot drop you off and come back to pick you up!

## 2024-12-20 ENCOUNTER — ANESTHESIA EVENT (OUTPATIENT)
Dept: PERIOP | Facility: HOSPITAL | Age: 64
End: 2024-12-20
Payer: MEDICARE

## 2024-12-20 ENCOUNTER — ANESTHESIA (OUTPATIENT)
Dept: PERIOP | Facility: HOSPITAL | Age: 64
End: 2024-12-20
Payer: MEDICARE

## 2024-12-20 ENCOUNTER — HOSPITAL ENCOUNTER (OUTPATIENT)
Facility: HOSPITAL | Age: 64
Setting detail: HOSPITAL OUTPATIENT SURGERY
Discharge: HOME | End: 2024-12-20
Attending: SURGERY | Admitting: SURGERY
Payer: MEDICARE

## 2024-12-20 VITALS
DIASTOLIC BLOOD PRESSURE: 69 MMHG | OXYGEN SATURATION: 97 % | WEIGHT: 165 LBS | SYSTOLIC BLOOD PRESSURE: 104 MMHG | RESPIRATION RATE: 16 BRPM | TEMPERATURE: 97 F | HEART RATE: 60 BPM | BODY MASS INDEX: 24.44 KG/M2 | HEIGHT: 69 IN

## 2024-12-20 DIAGNOSIS — D64.9 ANEMIA, UNSPECIFIED TYPE: Primary | ICD-10-CM

## 2024-12-20 DIAGNOSIS — D50.8 OTHER IRON DEFICIENCY ANEMIAS: ICD-10-CM

## 2024-12-20 DIAGNOSIS — D64.9 ANEMIA, UNSPECIFIED TYPE: ICD-10-CM

## 2024-12-20 PROBLEM — D50.9 IRON DEFICIENCY ANEMIA, UNSPECIFIED: Status: ACTIVE | Noted: 2024-12-20

## 2024-12-20 PROBLEM — K90.9 MALABSORPTION OF IRON: Status: ACTIVE | Noted: 2024-12-20

## 2024-12-20 LAB
DEPRECATED RDW RBC AUTO: 43.8 FL (ref 37–54)
ERYTHROCYTE [DISTWIDTH] IN BLOOD BY AUTOMATED COUNT: 15.8 % (ref 12.3–15.4)
FERRITIN SERPL-MCNC: 12.04 NG/ML (ref 30–400)
FOLATE SERPL-MCNC: 18.1 NG/ML (ref 4.78–24.2)
GLUCOSE BLDC GLUCOMTR-MCNC: 102 MG/DL (ref 70–130)
HCT VFR BLD AUTO: 30.6 % (ref 37.5–51)
HGB BLD-MCNC: 8.9 G/DL (ref 13–17.7)
IRON 24H UR-MRATE: 24 MCG/DL (ref 59–158)
IRON SATN MFR SERPL: 4 % (ref 20–50)
MCH RBC QN AUTO: 22.3 PG (ref 26.6–33)
MCHC RBC AUTO-ENTMCNC: 29.1 G/DL (ref 31.5–35.7)
MCV RBC AUTO: 76.5 FL (ref 79–97)
PLATELET # BLD AUTO: 212 10*3/MM3 (ref 140–450)
PMV BLD AUTO: 11.8 FL (ref 6–12)
RBC # BLD AUTO: 4 10*6/MM3 (ref 4.14–5.8)
TIBC SERPL-MCNC: 627 MCG/DL (ref 298–536)
TRANSFERRIN SERPL-MCNC: 421 MG/DL (ref 200–360)
VIT B12 BLD-MCNC: 455 PG/ML (ref 211–946)
WBC NRBC COR # BLD AUTO: 4.63 10*3/MM3 (ref 3.4–10.8)

## 2024-12-20 PROCEDURE — 82746 ASSAY OF FOLIC ACID SERUM: CPT | Performed by: SURGERY

## 2024-12-20 PROCEDURE — 85027 COMPLETE CBC AUTOMATED: CPT | Performed by: SURGERY

## 2024-12-20 PROCEDURE — 84466 ASSAY OF TRANSFERRIN: CPT | Performed by: SURGERY

## 2024-12-20 PROCEDURE — 83540 ASSAY OF IRON: CPT | Performed by: SURGERY

## 2024-12-20 PROCEDURE — 82607 VITAMIN B-12: CPT | Performed by: SURGERY

## 2024-12-20 PROCEDURE — 25010000002 PROPOFOL 10 MG/ML EMULSION: Performed by: NURSE ANESTHETIST, CERTIFIED REGISTERED

## 2024-12-20 PROCEDURE — 82948 REAGENT STRIP/BLOOD GLUCOSE: CPT

## 2024-12-20 PROCEDURE — 82728 ASSAY OF FERRITIN: CPT | Performed by: SURGERY

## 2024-12-20 PROCEDURE — 25010000002 PROPOFOL 200 MG/20ML EMULSION: Performed by: NURSE ANESTHETIST, CERTIFIED REGISTERED

## 2024-12-20 RX ORDER — MIDAZOLAM HYDROCHLORIDE 1 MG/ML
1 INJECTION, SOLUTION INTRAMUSCULAR; INTRAVENOUS
Status: DISCONTINUED | OUTPATIENT
Start: 2024-12-20 | End: 2024-12-20 | Stop reason: HOSPADM

## 2024-12-20 RX ORDER — SODIUM CHLORIDE 0.9 % (FLUSH) 0.9 %
10 SYRINGE (ML) INJECTION EVERY 12 HOURS SCHEDULED
Status: DISCONTINUED | OUTPATIENT
Start: 2024-12-20 | End: 2024-12-20 | Stop reason: HOSPADM

## 2024-12-20 RX ORDER — MEPERIDINE HYDROCHLORIDE 25 MG/ML
12.5 INJECTION INTRAMUSCULAR; INTRAVENOUS; SUBCUTANEOUS
Status: DISCONTINUED | OUTPATIENT
Start: 2024-12-20 | End: 2024-12-20 | Stop reason: HOSPADM

## 2024-12-20 RX ORDER — SODIUM CHLORIDE 0.9 % (FLUSH) 0.9 %
10 SYRINGE (ML) INJECTION AS NEEDED
Status: DISCONTINUED | OUTPATIENT
Start: 2024-12-20 | End: 2024-12-20 | Stop reason: HOSPADM

## 2024-12-20 RX ORDER — SODIUM CHLORIDE, SODIUM LACTATE, POTASSIUM CHLORIDE, CALCIUM CHLORIDE 600; 310; 30; 20 MG/100ML; MG/100ML; MG/100ML; MG/100ML
125 INJECTION, SOLUTION INTRAVENOUS ONCE
Status: DISCONTINUED | OUTPATIENT
Start: 2024-12-20 | End: 2024-12-20 | Stop reason: HOSPADM

## 2024-12-20 RX ORDER — PROPOFOL 10 MG/ML
INJECTION, EMULSION INTRAVENOUS AS NEEDED
Status: DISCONTINUED | OUTPATIENT
Start: 2024-12-20 | End: 2024-12-20 | Stop reason: SURG

## 2024-12-20 RX ORDER — SODIUM CHLORIDE 9 MG/ML
40 INJECTION, SOLUTION INTRAVENOUS AS NEEDED
Status: DISCONTINUED | OUTPATIENT
Start: 2024-12-20 | End: 2024-12-20 | Stop reason: HOSPADM

## 2024-12-20 RX ORDER — IPRATROPIUM BROMIDE AND ALBUTEROL SULFATE 2.5; .5 MG/3ML; MG/3ML
3 SOLUTION RESPIRATORY (INHALATION) ONCE AS NEEDED
Status: DISCONTINUED | OUTPATIENT
Start: 2024-12-20 | End: 2024-12-20 | Stop reason: HOSPADM

## 2024-12-20 RX ORDER — ONDANSETRON 2 MG/ML
4 INJECTION INTRAMUSCULAR; INTRAVENOUS AS NEEDED
Status: DISCONTINUED | OUTPATIENT
Start: 2024-12-20 | End: 2024-12-20 | Stop reason: HOSPADM

## 2024-12-20 RX ORDER — OXYCODONE AND ACETAMINOPHEN 5; 325 MG/1; MG/1
1 TABLET ORAL ONCE AS NEEDED
Status: DISCONTINUED | OUTPATIENT
Start: 2024-12-20 | End: 2024-12-20 | Stop reason: HOSPADM

## 2024-12-20 RX ORDER — POLYETHYLENE GLYCOL 3350 17 G/17G
17 POWDER, FOR SOLUTION ORAL DAILY
Qty: 30 EACH | Refills: 1 | Status: SHIPPED | OUTPATIENT
Start: 2024-12-20

## 2024-12-20 RX ADMIN — PROPOFOL 140 MCG/KG/MIN: 10 INJECTION, EMULSION INTRAVENOUS at 08:22

## 2024-12-20 RX ADMIN — PROPOFOL 20 MG: 10 INJECTION, EMULSION INTRAVENOUS at 08:32

## 2024-12-20 RX ADMIN — PROPOFOL 120 MG: 10 INJECTION, EMULSION INTRAVENOUS at 08:22

## 2024-12-20 RX ADMIN — PROPOFOL 60 MG: 10 INJECTION, EMULSION INTRAVENOUS at 08:26

## 2024-12-20 NOTE — ANESTHESIA POSTPROCEDURE EVALUATION
Patient: Iker Mayen    Procedure Summary       Date: 12/20/24 Room / Location: [facility] OR [address] [place] [address] COR OR    Anesthesia Start: 0819 Anesthesia Stop: 0844    Procedures:       COLONOSCOPY with possible biopsy      ESOPHAGOGASTRODUODENOSCOPY, possible biopsy or dilation (Esophagus) Diagnosis:       Anemia, unspecified type      (Anemia, unspecified type [D64.9])    Surgeons: Jonn Jenkins MD Provider: Mark Joshua MD    Anesthesia Type: general ASA Status: 3            Anesthesia Type: general    Vitals  Vitals Value Taken Time   BP 89/61 12/20/24 0851   Temp 97 °F (36.1 °C) 12/20/24 0846   Pulse 52 12/20/24 0853   Resp 16 12/20/24 0851   SpO2 100 % 12/20/24 0853   Vitals shown include unfiled device data.        Post Anesthesia Care and Evaluation    Patient location during evaluation: PACU  Patient participation: complete - patient participated  Level of consciousness: responsive to verbal stimuli  Pain score: 0  Pain management: satisfactory to patient    Airway patency: patent  Anesthetic complications: No anesthetic complications  PONV Status: none  Cardiovascular status: hemodynamically stable  Respiratory status: nasal cannula  Hydration status: acceptable

## 2024-12-20 NOTE — H&P
Chief complaint: Anemia     Iker Mayen is a 64 y.o.  male, prior history of gastric bypass for weight loss greater than 10 years ago, who presents my clinic with iron deficiency anemia.  He reports several months of malaise and fatigue.  He denies melena or hematochezia.  He was recently started on an iron supplement about 1 week ago.  The patient states since he had a back surgery he is dealt with a lack of fecal urgency, fecal incontinence and having to self catheterize.  He does not get the urge to have a bowel movement.  Does not take stool softeners.     Takes his multivitamin.  The patient takes 800 mg ibuprofen as needed, this may be 1-2 times per week.  He takes his PPI     The patient has had 2 open surgeries for what sounds like internal hernias following his gastric bypass.  No family history of colon cancer, ulcerative colitis or Crohn's disease.  He had a colonoscopy greater than 10 years ago    Interval history: Continues to have decreased hemoglobin. He is taking PPI and avoiding NSAIDs     Medical History        Past Medical History:   Diagnosis Date    Arthritis      Diabetes mellitus       Prior to weight loss    Disease of thyroid gland      GERD (gastroesophageal reflux disease)      Herniation of lumbar intervertebral disc with compression of cauda equina      Hypertension      Partial paralysis of both lower limbs       sides of legs and feet            Surgical History         Past Surgical History:   Procedure Laterality Date    ABDOMINAL SURGERY        BACK SURGERY         X4    CHOLECYSTECTOMY        COLONOSCOPY        DUPUYTREN CONTRACTURE RELEASE Left 1/4/2024     Procedure: PARTIAL PALMAR FASCIECTOMY LEFT HAND FIFTH FINGER;  Surgeon: Ankush Alexander MD;  Location: [facility];  Service: Orthopedics;  Laterality: Left;    ENDOSCOPY        HERNIA REPAIR        REBEKAH-EN-Y        TONSILLECTOMY                         Family History   Problem Relation Age of Onset    Cancer Father   "         Kidney            Social History   Social History            Socioeconomic History    Marital status:    Tobacco Use    Smoking status: Former       Types: Cigarettes    Smokeless tobacco: Never   Vaping Use    Vaping status: Never Used   Substance and Sexual Activity    Alcohol use: Not Currently    Drug use: Never    Sexual activity: Yes       Partners: Female                        Review of Systems                 14 pt ROS negative except for what is noted in HPI              Objective  Physical Exam:   Body mass index is 27.68 kg/m².  Constitution: /68 (BP Location: Left arm, Patient Position: Sitting, Cuff Size: Adult)   Pulse 60   Ht 172.7 cm (67.99\")   Wt 82.6 kg (182 lb)   BMI 27.68 kg/m²  . No acute distress   Head: Normocephalic, atraumatic.   Eyes: Aligned without strabismus. Conjunctivae noninjected   Ears, Nose, Mouth: , no lesions appreciated  Respiratory: Symmetric chest expansion. No respiratory distress.   Gastrointestinal:  Soft, nondistended, nontender.  Midline laparotomy in place  Skin:  No cyanosis, clubbing or edema bilaterally    Lymphatics: No abnormal cervical or supraclavicular adenopathy  Neurologic: No gross deficits.  Alert and oriented x3  Psychiatric: Normal mood             Iker Mayen is a 64 y.o.  male, history of gastric bypass, with iron deficiency anemia    To endoscopy for EGD and colonoscopy  "

## 2024-12-20 NOTE — ANESTHESIA PREPROCEDURE EVALUATION
Anesthesia Evaluation     Patient summary reviewed and Nursing notes reviewed   no history of anesthetic complications:   NPO Solid Status: > 8 hours  NPO Liquid Status: > 8 hours           Airway   Mallampati: I  TM distance: >3 FB  Neck ROM: full  No difficulty expected  Dental    (+) lower dentures and upper dentures    Pulmonary - normal exam    breath sounds clear to auscultation  (+) a smoker Former,  Cardiovascular - normal exam  Exercise tolerance: good (4-7 METS)    Patient on routine beta blocker and Beta blocker given within 24 hours of surgery  Rhythm: regular  Rate: normal    (+) hypertension      Neuro/Psych- negative ROS  GI/Hepatic/Renal/Endo    (+) GERD, diabetes mellitus, thyroid problem hypothyroidism    Musculoskeletal     Abdominal  - normal exam    Abdomen: soft.   Substance History - negative use     OB/GYN          Other   arthritis,                   Anesthesia Plan    ASA 3     general     intravenous induction     Anesthetic plan, risks, benefits, and alternatives have been provided, discussed and informed consent has been obtained with: patient.  Pre-procedure education provided  Use of blood products discussed with  Consented to blood products.    Plan discussed with CRNA.      CODE STATUS:

## 2024-12-23 LAB — REF LAB TEST METHOD: NORMAL

## 2024-12-27 ENCOUNTER — INFUSION (OUTPATIENT)
Dept: ONCOLOGY | Facility: HOSPITAL | Age: 64
End: 2024-12-27
Payer: MEDICARE

## 2024-12-27 VITALS
RESPIRATION RATE: 16 BRPM | HEART RATE: 59 BPM | OXYGEN SATURATION: 98 % | TEMPERATURE: 97.5 F | DIASTOLIC BLOOD PRESSURE: 62 MMHG | SYSTOLIC BLOOD PRESSURE: 113 MMHG

## 2024-12-27 DIAGNOSIS — D50.9 IRON DEFICIENCY ANEMIA, UNSPECIFIED IRON DEFICIENCY ANEMIA TYPE: Primary | ICD-10-CM

## 2024-12-27 DIAGNOSIS — K90.9 MALABSORPTION OF IRON: ICD-10-CM

## 2024-12-27 PROCEDURE — 96365 THER/PROPH/DIAG IV INF INIT: CPT

## 2024-12-27 PROCEDURE — 25010000002 NA FERRIC GLUC CPLX PER 12.5 MG: Performed by: SURGERY

## 2024-12-27 RX ADMIN — SODIUM CHLORIDE 62.5 MG: 9 INJECTION, SOLUTION INTRAVENOUS at 14:19

## 2025-03-24 ENCOUNTER — LAB (OUTPATIENT)
Dept: ONCOLOGY | Facility: CLINIC | Age: 65
End: 2025-03-24
Payer: MEDICARE

## 2025-03-24 ENCOUNTER — CONSULT (OUTPATIENT)
Dept: ONCOLOGY | Facility: CLINIC | Age: 65
End: 2025-03-24
Payer: MEDICARE

## 2025-03-24 VITALS
SYSTOLIC BLOOD PRESSURE: 128 MMHG | DIASTOLIC BLOOD PRESSURE: 74 MMHG | WEIGHT: 169.8 LBS | OXYGEN SATURATION: 98 % | BODY MASS INDEX: 25.08 KG/M2 | HEART RATE: 72 BPM | RESPIRATION RATE: 18 BRPM | TEMPERATURE: 97.1 F

## 2025-03-24 DIAGNOSIS — R53.82 CHRONIC FATIGUE, UNSPECIFIED: ICD-10-CM

## 2025-03-24 DIAGNOSIS — K90.9 MALABSORPTION OF IRON: Primary | ICD-10-CM

## 2025-03-24 DIAGNOSIS — K90.9 MALABSORPTION OF IRON: ICD-10-CM

## 2025-03-24 DIAGNOSIS — D50.9 IRON DEFICIENCY ANEMIA, UNSPECIFIED IRON DEFICIENCY ANEMIA TYPE: ICD-10-CM

## 2025-03-24 DIAGNOSIS — D50.9 IRON DEFICIENCY ANEMIA, UNSPECIFIED IRON DEFICIENCY ANEMIA TYPE: Primary | ICD-10-CM

## 2025-03-24 LAB
ALBUMIN SERPL-MCNC: 3.9 G/DL (ref 3.5–5.2)
ALBUMIN/GLOB SERPL: 1.4 G/DL
ALP SERPL-CCNC: 103 U/L (ref 39–117)
ALT SERPL W P-5'-P-CCNC: 24 U/L (ref 1–41)
ANION GAP SERPL CALCULATED.3IONS-SCNC: 9.4 MMOL/L (ref 5–15)
AST SERPL-CCNC: 29 U/L (ref 1–40)
BASOPHILS # BLD AUTO: 0.06 10*3/MM3 (ref 0–0.2)
BASOPHILS NFR BLD AUTO: 1.1 % (ref 0–1.5)
BILIRUB SERPL-MCNC: 0.3 MG/DL (ref 0–1.2)
BUN SERPL-MCNC: 18 MG/DL (ref 8–23)
BUN/CREAT SERPL: 17.8 (ref 7–25)
CALCIUM SPEC-SCNC: 8.9 MG/DL (ref 8.6–10.5)
CHLORIDE SERPL-SCNC: 106 MMOL/L (ref 98–107)
CO2 SERPL-SCNC: 24.6 MMOL/L (ref 22–29)
CREAT SERPL-MCNC: 1.01 MG/DL (ref 0.76–1.27)
CRP SERPL-MCNC: <0.3 MG/DL (ref 0–0.5)
DEPRECATED RDW RBC AUTO: 50 FL (ref 37–54)
EGFRCR SERPLBLD CKD-EPI 2021: 82.5 ML/MIN/1.73
EOSINOPHIL # BLD AUTO: 0.37 10*3/MM3 (ref 0–0.4)
EOSINOPHIL NFR BLD AUTO: 6.7 % (ref 0.3–6.2)
ERYTHROCYTE [DISTWIDTH] IN BLOOD BY AUTOMATED COUNT: 17.6 % (ref 12.3–15.4)
ERYTHROCYTE [SEDIMENTATION RATE] IN BLOOD: 19 MM/HR (ref 0–20)
FERRITIN SERPL-MCNC: 12.14 NG/ML (ref 30–400)
FOLATE SERPL-MCNC: 14.4 NG/ML (ref 4.78–24.2)
GLOBULIN UR ELPH-MCNC: 2.8 GM/DL
GLUCOSE SERPL-MCNC: 95 MG/DL (ref 65–99)
HAPTOGLOB SERPL-MCNC: 179 MG/DL (ref 30–200)
HCT VFR BLD AUTO: 31.6 % (ref 37.5–51)
HGB BLD-MCNC: 9.1 G/DL (ref 13–17.7)
IMM GRANULOCYTES # BLD AUTO: 0.01 10*3/MM3 (ref 0–0.05)
IMM GRANULOCYTES NFR BLD AUTO: 0.2 % (ref 0–0.5)
IRON 24H UR-MRATE: 23 MCG/DL (ref 59–158)
IRON SATN MFR SERPL: 4 % (ref 20–50)
LDH SERPL-CCNC: 172 U/L (ref 135–225)
LYMPHOCYTES # BLD AUTO: 1.26 10*3/MM3 (ref 0.7–3.1)
LYMPHOCYTES NFR BLD AUTO: 23 % (ref 19.6–45.3)
MCH RBC QN AUTO: 22.5 PG (ref 26.6–33)
MCHC RBC AUTO-ENTMCNC: 28.8 G/DL (ref 31.5–35.7)
MCV RBC AUTO: 78 FL (ref 79–97)
MONOCYTES # BLD AUTO: 0.45 10*3/MM3 (ref 0.1–0.9)
MONOCYTES NFR BLD AUTO: 8.2 % (ref 5–12)
NEUTROPHILS NFR BLD AUTO: 3.34 10*3/MM3 (ref 1.7–7)
NEUTROPHILS NFR BLD AUTO: 60.8 % (ref 42.7–76)
NRBC BLD AUTO-RTO: 0 /100 WBC (ref 0–0.2)
PLATELET # BLD AUTO: 234 10*3/MM3 (ref 140–450)
PMV BLD AUTO: 11.2 FL (ref 6–12)
POTASSIUM SERPL-SCNC: 4.2 MMOL/L (ref 3.5–5.2)
PROT SERPL-MCNC: 6.7 G/DL (ref 6–8.5)
RBC # BLD AUTO: 4.05 10*6/MM3 (ref 4.14–5.8)
RETICS # AUTO: 0.02 10*6/MM3 (ref 0.02–0.13)
RETICS/RBC NFR AUTO: 0.45 % (ref 0.7–1.9)
SODIUM SERPL-SCNC: 140 MMOL/L (ref 136–145)
TIBC SERPL-MCNC: 632 MCG/DL (ref 298–536)
TRANSFERRIN SERPL-MCNC: 424 MG/DL (ref 200–360)
TSH SERPL DL<=0.05 MIU/L-ACNC: 2.67 UIU/ML (ref 0.27–4.2)
VIT B12 BLD-MCNC: 384 PG/ML (ref 211–946)
WBC NRBC COR # BLD AUTO: 5.49 10*3/MM3 (ref 3.4–10.8)

## 2025-03-24 PROCEDURE — 84630 ASSAY OF ZINC: CPT

## 2025-03-24 PROCEDURE — 83540 ASSAY OF IRON: CPT

## 2025-03-24 PROCEDURE — 1160F RVW MEDS BY RX/DR IN RCRD: CPT

## 2025-03-24 PROCEDURE — 1159F MED LIST DOCD IN RCRD: CPT

## 2025-03-24 PROCEDURE — 86140 C-REACTIVE PROTEIN: CPT

## 2025-03-24 PROCEDURE — 83615 LACTATE (LD) (LDH) ENZYME: CPT

## 2025-03-24 PROCEDURE — 84466 ASSAY OF TRANSFERRIN: CPT

## 2025-03-24 PROCEDURE — 82525 ASSAY OF COPPER: CPT

## 2025-03-24 PROCEDURE — 80053 COMPREHEN METABOLIC PANEL: CPT

## 2025-03-24 PROCEDURE — 84443 ASSAY THYROID STIM HORMONE: CPT

## 2025-03-24 PROCEDURE — 1126F AMNT PAIN NOTED NONE PRSNT: CPT

## 2025-03-24 PROCEDURE — 82746 ASSAY OF FOLIC ACID SERUM: CPT

## 2025-03-24 PROCEDURE — 85045 AUTOMATED RETICULOCYTE COUNT: CPT

## 2025-03-24 PROCEDURE — 85652 RBC SED RATE AUTOMATED: CPT

## 2025-03-24 PROCEDURE — 82607 VITAMIN B-12: CPT

## 2025-03-24 PROCEDURE — 85025 COMPLETE CBC W/AUTO DIFF WBC: CPT

## 2025-03-24 PROCEDURE — 86364 TISS TRNSGLTMNASE EA IG CLAS: CPT

## 2025-03-24 PROCEDURE — 83010 ASSAY OF HAPTOGLOBIN QUANT: CPT

## 2025-03-24 PROCEDURE — 82728 ASSAY OF FERRITIN: CPT

## 2025-03-24 PROCEDURE — 36415 COLL VENOUS BLD VENIPUNCTURE: CPT

## 2025-03-24 PROCEDURE — 99204 OFFICE O/P NEW MOD 45 MIN: CPT

## 2025-03-24 RX ORDER — FAMOTIDINE 10 MG/ML
20 INJECTION, SOLUTION INTRAVENOUS AS NEEDED
OUTPATIENT
Start: 2025-04-01

## 2025-03-24 RX ORDER — FAMOTIDINE 10 MG/ML
20 INJECTION, SOLUTION INTRAVENOUS AS NEEDED
OUTPATIENT
Start: 2025-04-02

## 2025-03-24 RX ORDER — FAMOTIDINE 10 MG/ML
20 INJECTION, SOLUTION INTRAVENOUS AS NEEDED
Status: CANCELLED | OUTPATIENT
Start: 2025-03-24

## 2025-03-24 RX ORDER — FAMOTIDINE 10 MG/ML
20 INJECTION, SOLUTION INTRAVENOUS AS NEEDED
OUTPATIENT
Start: 2025-04-03

## 2025-03-24 RX ORDER — DIPHENHYDRAMINE HYDROCHLORIDE 50 MG/ML
50 INJECTION, SOLUTION INTRAMUSCULAR; INTRAVENOUS AS NEEDED
OUTPATIENT
Start: 2025-04-02

## 2025-03-24 RX ORDER — DIPHENHYDRAMINE HYDROCHLORIDE 50 MG/ML
50 INJECTION, SOLUTION INTRAMUSCULAR; INTRAVENOUS AS NEEDED
OUTPATIENT
Start: 2025-04-03

## 2025-03-24 RX ORDER — HYDROCORTISONE SODIUM SUCCINATE 100 MG/2ML
100 INJECTION INTRAMUSCULAR; INTRAVENOUS AS NEEDED
OUTPATIENT
Start: 2025-04-03

## 2025-03-24 RX ORDER — SODIUM CHLORIDE 9 MG/ML
20 INJECTION, SOLUTION INTRAVENOUS ONCE
Status: CANCELLED | OUTPATIENT
Start: 2025-03-24

## 2025-03-24 RX ORDER — DIPHENHYDRAMINE HYDROCHLORIDE 50 MG/ML
50 INJECTION, SOLUTION INTRAMUSCULAR; INTRAVENOUS AS NEEDED
Status: CANCELLED | OUTPATIENT
Start: 2025-03-24

## 2025-03-24 RX ORDER — HYDROCORTISONE SODIUM SUCCINATE 100 MG/2ML
100 INJECTION INTRAMUSCULAR; INTRAVENOUS AS NEEDED
OUTPATIENT
Start: 2025-04-02

## 2025-03-24 RX ORDER — HYDROCORTISONE SODIUM SUCCINATE 100 MG/2ML
100 INJECTION INTRAMUSCULAR; INTRAVENOUS AS NEEDED
OUTPATIENT
Start: 2025-04-01

## 2025-03-24 RX ORDER — HYDROCORTISONE SODIUM SUCCINATE 100 MG/2ML
100 INJECTION INTRAMUSCULAR; INTRAVENOUS AS NEEDED
Status: CANCELLED | OUTPATIENT
Start: 2025-03-24

## 2025-03-24 RX ORDER — DIPHENHYDRAMINE HYDROCHLORIDE 50 MG/ML
50 INJECTION, SOLUTION INTRAMUSCULAR; INTRAVENOUS AS NEEDED
OUTPATIENT
Start: 2025-04-01

## 2025-03-24 NOTE — PROGRESS NOTES
DATE OF CONSULTATION:  3/24/2025    REASON FOR REFERRAL: Anemia     REFERRING PHYSICIAN:  Tri Espitia DO    CHIEF COMPLAINT:  Dizziness, fatigue    HISTORY OF PRESENT ILLNESS:   Iker Mayen is a very pleasant 65 y.o. male who is being seen today at the request of Tri Espitia DO for evaluation and treatment of anemia.  Mr. Mayen reports following with his PCP routinely every 6 months, with lab work at every other visit.  He reports being aware of his anemia for about the past year.  He has took oral iron in the past which caused extreme diarrhea.  He was given Ferrlecit recently on 12/27/2024.  He states he tolerated this well but did not see much of an improvement in his overall symptoms.  At present, he reports chronic fatigue and dizziness/lightheadedness.  He denies any shortness of breath or palpitations.  Denies any blood loss from any obvious sources.  Denies any known kidney disease.  Denies any chronic aches or pains.  He did recently have an EGD/colonoscopy on 12/20/2024.  These were unremarkable, 1 polyp was removed which showed tubular adenomas.  He does have a history of gastric bypass surgery.  Previous available lab work was reviewed and H&H was normal up until April 2022, except for 1 instance on August 2020 when he had a hemoglobin of 12.3.  Between 4/28/2022 and 3/11/2025 hemoglobin ranged from 8.9-13.2.  Most recently on 3/11/2025 H&H was 9.3/31.2.  Iron panels on 4/22/2021 and 2/22/2022 were normal.  Between 11/13/2024 and 3/11/202 serum iron ranged from 20.5-24, iron sat 3-4%.  The only ferritin that I have to review was on 12/20/2024 and this was 12.04 at that time.      PAST MEDICAL HISTORY:  Past Medical History:   Diagnosis Date    Arthritis     Diabetes mellitus     Prior to weight loss    Disease of thyroid gland     GERD (gastroesophageal reflux disease)     Herniation of lumbar intervertebral disc with compression of cauda equina     Hypertension     Partial  paralysis of both lower limbs     sides of legs and feet       PAST SURGICAL HISTORY:  Past Surgical History:   Procedure Laterality Date    ABDOMINAL SURGERY      BACK SURGERY      X4    CHOLECYSTECTOMY      COLONOSCOPY      COLONOSCOPY N/A 12/20/2024    Procedure: COLONOSCOPY with possible biopsy;  Surgeon: Jonn Jenkins MD;  Location: [facility];  Service: Gastroenterology;  Laterality: N/A;    DUPUYTREN CONTRACTURE RELEASE Left 1/4/2024    Procedure: PARTIAL PALMAR FASCIECTOMY LEFT HAND FIFTH FINGER;  Surgeon: Ankush Alexander MD;  Location: [facility] OR;  Service: Orthopedics;  Laterality: Left;    ENDOSCOPY      ENDOSCOPY N/A 12/20/2024    Procedure: ESOPHAGOGASTRODUODENOSCOPY, possible biopsy or dilation;  Surgeon: Jonn Jenkins MD;  Location: [facility];  Service: Gastroenterology;  Laterality: N/A;    HERNIA REPAIR      REBEKAH-EN-Y      TONSILLECTOMY         FAMILY HISTORY:  Family History   Problem Relation Age of Onset    Diabetes Mother     Cancer Father         Kidney    Kidney disease Father        SOCIAL HISTORY:  Social History     Socioeconomic History    Marital status:    Tobacco Use    Smoking status: Former     Types: Cigarettes    Smokeless tobacco: Never   Vaping Use    Vaping status: Never Used   Substance and Sexual Activity    Alcohol use: Not Currently    Drug use: Never    Sexual activity: Yes     Partners: Female       MEDICATIONS:  The current medication list was reviewed in the EMR    Current Outpatient Medications:     aspirin 81 MG EC tablet, Take 1 tablet by mouth Daily. ON HOLD FOR SURGERY, Disp: , Rfl:     carvedilol (COREG) 6.25 MG tablet, Take 1 tablet by mouth 2 (Two) Times a Day., Disp: , Rfl:     gabapentin (NEURONTIN) 100 MG capsule, Take 1 capsule by mouth Every Night., Disp: , Rfl:     levothyroxine (SYNTHROID, LEVOTHROID) 50 MCG tablet, Take 1 tablet by mouth Daily., Disp: , Rfl:     losartan-hydrochlorothiazide (HYZAAR) 100-12.5 MG per tablet, take 1  tablet by mouth 1 time each day, Disp: , Rfl:     omeprazole (priLOSEC) 20 MG capsule, , Disp: , Rfl:     polyethylene glycol (MIRALAX) 17 g packet, Take 17 g by mouth Daily., Disp: 30 each, Rfl: 1    sertraline (ZOLOFT) 50 MG tablet, Take 2 tablets by mouth Daily., Disp: , Rfl:     traZODone (DESYREL) 100 MG tablet, Take 1 tablet by mouth Daily., Disp: , Rfl:     Current Facility-Administered Medications:     ferric gluconate (FERRLECIT) 62.5 mg in sodium chloride 0.9 % 100 mL IVPB, 62.5 mg, Intravenous, Once, Jonn Jenkins MD    ALLERGIES:    Allergies   Allergen Reactions    Adhesive Tape Rash    Latex Unknown - Low Severity and Other (See Comments)     Latex tape, blisters on skin    Wound Dressing Adhesive Other (See Comments)         REVIEW OF SYSTEMS:    A comprehensive 14 point review of systems was performed.  Significant findings as mentioned above.  All other systems reviewed and are negative.                  Physical Exam   Vital Signs: /74   Pulse 72   Temp 97.1 °F (36.2 °C) (Temporal)   Resp 18   Wt 77 kg (169 lb 12.8 oz)   SpO2 98%   BMI 25.08 kg/m²      General: Well developed, well nourished, alert and oriented x 3, in no acute distress.   Head: ATNC   Eyes: PERRL, No evidence of conjunctivitis.   Nose: No nasal discharge.   Mouth: Oral mucosal membranes moist. No oral ulceration or hemorrhages.   Neck: Neck supple. No thyromegaly. No JVD.   Lungs: Clear in all fields to A&P. No wheezing.   Heart: S1, S2. Regular rate and rhythm. No murmurs, rubs, or gallops.   Abdomen: Soft. Bowel sounds are normoactive. Nontender with palpation. No Hepatosplenomegaly can be appreciated.   Extremities: No cyanosis or edema. Peripheral pulses palpable and equal bilaterally.   Integumentary: No rash, sores, erythema or nodules. No blistering, bruising, or dry skin.   Neurologic: Grossly non-focal exam    Pain Score:  Pain Score    03/24/25 0814   PainSc: 0-No pain       PHQ-Score Total:  PHQ-9 Total  Score:         PATHOLOGY:        ENDOSCOPY:                      IMAGING:  No Images in the past 120 days found..     Previous Labs:     3/11/25                      11/13/24        3/27/24      12/21/22      11/17/22        4/30/22      4/29/22      4/28/22        4/27/22        2/22/22            4/22/21              10/23/20        8/31/20              RECENT LABS:  Lab Results   Component Value Date    WBC 5.49 03/24/2025    HGB 9.1 (L) 03/24/2025    HCT 31.6 (L) 03/24/2025    MCV 78.0 (L) 03/24/2025    RDW 17.6 (H) 03/24/2025     03/24/2025    NEUTRORELPCT 60.8 03/24/2025    LYMPHORELPCT 23.0 03/24/2025    MONORELPCT 8.2 03/24/2025    EOSRELPCT 6.7 (H) 03/24/2025    BASORELPCT 1.1 03/24/2025    NEUTROABS 3.34 03/24/2025    LYMPHSABS 1.26 03/24/2025     Lab Results   Component Value Date     03/24/2025    K 4.2 03/24/2025    CO2 24.6 03/24/2025     03/24/2025    BUN 18 03/24/2025    CREATININE 1.01 03/24/2025    GLUCOSE 95 03/24/2025    CALCIUM 8.9 03/24/2025    ALKPHOS 103 03/24/2025    AST 29 03/24/2025    ALT 24 03/24/2025    BILITOT 0.3 03/24/2025    ALBUMIN 3.9 03/24/2025    PROTEINTOT 6.7 03/24/2025    MG 1.7 (L) 10/10/2023     Lab Results   Component Value Date/Time     03/24/2025 08:54 AM     C-Reactive Protein <0.30     Reticulocyte % 0.45 Low    Reticulocyte Absolute 0.0182 Low      Sed Rate 19     TSH 2.670     Lab Results   Component Value Date    FERRITIN 12.14 (L) 03/24/2025    IRON 23 (L) 03/24/2025    TIBC 632 (H) 03/24/2025    LABIRON 4 (L) 03/24/2025    FEZZVTPV05 455 12/20/2024    FOLATE 18.10 12/20/2024         ASSESSMENT & PLAN:  Iker Mayen is a very pleasant 65 y.o. male with    Iron Deficiency Anemia   -Previous available lab work was reviewed and H&H was normal up until April 2022, except for 1 instance on August 2020 when he had a hemoglobin of 12.3.  Between 4/28/2022 and 3/11/2025 hemoglobin ranged from 8.9-13.2.  Most recently on 3/11/2025 H&H was  9.3/31.2.  Iron panels on 4/22/2021 and 2/22/2022 were normal.  Between 11/13/2024 and 3/11/202 serum iron ranged from 20.5-24, iron sat 3-4%.  The only ferritin that I have to review was on 12/20/2024 and this was 12.04 at that time.  -Reports oral iron causes diarrhea. He received Ferrlecit on 12/27/2024.  He states he tolerated this well but did not see much of an improvement in his overall symptoms.    -At present, he reports chronic fatigue and dizziness/lightheadedness.  He denies any shortness of breath or palpitations.  Denies any blood loss from any obvious sources.  Denies any known kidney disease.  Denies any chronic aches or pains.  -EGD/colonoscopy on 12/20/2024 was unremarkable, 1 polyp was removed which showed tubular adenomas.    -Obtained repeat CBC today which showed hemoglobin of 9.1, hematocrit 31.6.  WBCs and platelets are normal.  Iron panel/ferritin shows low serum iron of 23, low iron sat of 4% and ferritin of 12.14.  Therefore, we will replace with IV iron pending insurance approval.  -Obtained additional labs today to further evaluate anemia including CMP, B12, folate, reticulocytes, haptoglobin, LDH, ESR, CRP, TSH, copper, zinc and tissue transglutaminase.  If anemia remains unexplained will obtain SPEP/BOBBY and SFLC.  -Will plan to follow-up in about 6 weeks after replacement with repeat labs.  In the meantime, we will notify patient if any further intervention is needed.        ACO / JOANIE/Other  Quality measures  -  Iker Mayen did not receive 2024 flu vaccine.  This was recommended.  -  Iker Mayen reports a pain score of 0.   -  Current outpatient and discharge medications have been reconciled for the patient.  Reviewed by: KRYSTAL Correa        The patient was in agreement with the plan and all questions were answered to his satisfaction.     Thank you so much for allowing us to participate in the care of Iker Mayen . Please do not hesitate to contact us with  any questions or concerns.     A total of 45 minutes were spent coordinating this patient’s care in clinic today; more than 50% of this time was face-to-face with the patient, reviewing his interim medical history and counseling on the current treatment and followup plan. All questions were answered to his satisfaction.      Electronically Signed by: KRYSTAL Correa , March 24, 2025 10:34 EDT       CC:   DO Omkar Coombs Angela Yvonne, DO

## 2025-03-24 NOTE — PROGRESS NOTES
Venipuncture Blood Specimen Collection  Venipuncture performed in Left arm by Megan Duran MA with good hemostasis. Patient tolerated the procedure well without complications.   03/24/25   Megan Duran MA

## 2025-03-25 ENCOUNTER — PATIENT ROUNDING (BHMG ONLY) (OUTPATIENT)
Dept: ONCOLOGY | Facility: CLINIC | Age: 65
End: 2025-03-25
Payer: MEDICARE

## 2025-03-25 LAB
REF LAB TEST METHOD: NORMAL
TTG IGA SER-ACNC: <2 U/ML (ref 0–3)

## 2025-03-29 LAB
COPPER SERPL-MCNC: 74 UG/DL (ref 69–132)
ZINC SERPL-MCNC: 65 UG/DL (ref 44–115)

## 2025-04-08 ENCOUNTER — INFUSION (OUTPATIENT)
Dept: ONCOLOGY | Facility: HOSPITAL | Age: 65
End: 2025-04-08
Payer: MEDICARE

## 2025-04-08 VITALS
RESPIRATION RATE: 18 BRPM | WEIGHT: 166 LBS | BODY MASS INDEX: 24.51 KG/M2 | TEMPERATURE: 97.8 F | DIASTOLIC BLOOD PRESSURE: 58 MMHG | OXYGEN SATURATION: 98 % | HEART RATE: 60 BPM | SYSTOLIC BLOOD PRESSURE: 119 MMHG

## 2025-04-08 DIAGNOSIS — K90.9 MALABSORPTION OF IRON: ICD-10-CM

## 2025-04-08 DIAGNOSIS — D50.9 IRON DEFICIENCY ANEMIA, UNSPECIFIED IRON DEFICIENCY ANEMIA TYPE: Primary | ICD-10-CM

## 2025-04-08 PROCEDURE — 25010000002 IRON SUCROSE PER 1 MG

## 2025-04-08 PROCEDURE — 96366 THER/PROPH/DIAG IV INF ADDON: CPT

## 2025-04-08 PROCEDURE — 96365 THER/PROPH/DIAG IV INF INIT: CPT

## 2025-04-08 RX ADMIN — IRON SUCROSE 300 MG: 20 INJECTION, SOLUTION INTRAVENOUS at 11:21

## 2025-04-09 ENCOUNTER — INFUSION (OUTPATIENT)
Dept: ONCOLOGY | Facility: HOSPITAL | Age: 65
End: 2025-04-09
Payer: MEDICARE

## 2025-04-09 VITALS
BODY MASS INDEX: 24.26 KG/M2 | RESPIRATION RATE: 18 BRPM | TEMPERATURE: 98.1 F | WEIGHT: 164.3 LBS | OXYGEN SATURATION: 99 % | HEART RATE: 67 BPM | SYSTOLIC BLOOD PRESSURE: 105 MMHG | DIASTOLIC BLOOD PRESSURE: 59 MMHG

## 2025-04-09 DIAGNOSIS — D50.9 IRON DEFICIENCY ANEMIA, UNSPECIFIED IRON DEFICIENCY ANEMIA TYPE: Primary | ICD-10-CM

## 2025-04-09 DIAGNOSIS — K90.9 MALABSORPTION OF IRON: ICD-10-CM

## 2025-04-09 PROCEDURE — 25010000002 IRON SUCROSE PER 1 MG

## 2025-04-09 PROCEDURE — 96365 THER/PROPH/DIAG IV INF INIT: CPT

## 2025-04-09 RX ADMIN — IRON SUCROSE 300 MG: 20 INJECTION, SOLUTION INTRAVENOUS at 13:15

## 2025-04-10 ENCOUNTER — INFUSION (OUTPATIENT)
Dept: ONCOLOGY | Facility: HOSPITAL | Age: 65
End: 2025-04-10
Payer: MEDICARE

## 2025-04-10 VITALS
WEIGHT: 162.8 LBS | DIASTOLIC BLOOD PRESSURE: 54 MMHG | BODY MASS INDEX: 24.04 KG/M2 | RESPIRATION RATE: 18 BRPM | SYSTOLIC BLOOD PRESSURE: 107 MMHG | TEMPERATURE: 97.6 F | OXYGEN SATURATION: 99 % | HEART RATE: 57 BPM

## 2025-04-10 DIAGNOSIS — K90.9 MALABSORPTION OF IRON: ICD-10-CM

## 2025-04-10 DIAGNOSIS — D50.9 IRON DEFICIENCY ANEMIA, UNSPECIFIED IRON DEFICIENCY ANEMIA TYPE: Primary | ICD-10-CM

## 2025-04-10 PROCEDURE — 96365 THER/PROPH/DIAG IV INF INIT: CPT

## 2025-04-10 PROCEDURE — 25010000002 IRON SUCROSE PER 1 MG

## 2025-04-10 PROCEDURE — 96366 THER/PROPH/DIAG IV INF ADDON: CPT

## 2025-04-10 RX ADMIN — IRON SUCROSE 300 MG: 20 INJECTION, SOLUTION INTRAVENOUS at 08:13

## 2025-07-07 ENCOUNTER — LAB (OUTPATIENT)
Dept: ONCOLOGY | Facility: CLINIC | Age: 65
End: 2025-07-07
Payer: MEDICARE

## 2025-07-07 ENCOUNTER — OFFICE VISIT (OUTPATIENT)
Dept: ONCOLOGY | Facility: CLINIC | Age: 65
End: 2025-07-07
Payer: MEDICARE

## 2025-07-07 VITALS
DIASTOLIC BLOOD PRESSURE: 64 MMHG | OXYGEN SATURATION: 98 % | WEIGHT: 155.4 LBS | BODY MASS INDEX: 23.55 KG/M2 | RESPIRATION RATE: 18 BRPM | HEART RATE: 75 BPM | SYSTOLIC BLOOD PRESSURE: 103 MMHG | TEMPERATURE: 97.1 F | HEIGHT: 68 IN

## 2025-07-07 DIAGNOSIS — R53.82 CHRONIC FATIGUE, UNSPECIFIED: ICD-10-CM

## 2025-07-07 DIAGNOSIS — D50.9 IRON DEFICIENCY ANEMIA, UNSPECIFIED IRON DEFICIENCY ANEMIA TYPE: ICD-10-CM

## 2025-07-07 DIAGNOSIS — D50.9 IRON DEFICIENCY ANEMIA, UNSPECIFIED IRON DEFICIENCY ANEMIA TYPE: Primary | ICD-10-CM

## 2025-07-07 DIAGNOSIS — K90.9 MALABSORPTION OF IRON: ICD-10-CM

## 2025-07-07 LAB
BASOPHILS # BLD AUTO: 0.06 10*3/MM3 (ref 0–0.2)
BASOPHILS NFR BLD AUTO: 1.1 % (ref 0–1.5)
DEPRECATED RDW RBC AUTO: 56.9 FL (ref 37–54)
EOSINOPHIL # BLD AUTO: 0.3 10*3/MM3 (ref 0–0.4)
EOSINOPHIL NFR BLD AUTO: 5.6 % (ref 0.3–6.2)
ERYTHROCYTE [DISTWIDTH] IN BLOOD BY AUTOMATED COUNT: 16.9 % (ref 12.3–15.4)
FERRITIN SERPL-MCNC: 16.3 NG/ML (ref 30–400)
HCT VFR BLD AUTO: 34.8 % (ref 37.5–51)
HGB BLD-MCNC: 10.5 G/DL (ref 13–17.7)
IMM GRANULOCYTES # BLD AUTO: 0.01 10*3/MM3 (ref 0–0.05)
IMM GRANULOCYTES NFR BLD AUTO: 0.2 % (ref 0–0.5)
IRON 24H UR-MRATE: 48 MCG/DL (ref 59–158)
IRON SATN MFR SERPL: 9 % (ref 20–50)
LYMPHOCYTES # BLD AUTO: 1.51 10*3/MM3 (ref 0.7–3.1)
LYMPHOCYTES NFR BLD AUTO: 28.2 % (ref 19.6–45.3)
MCH RBC QN AUTO: 27.2 PG (ref 26.6–33)
MCHC RBC AUTO-ENTMCNC: 30.2 G/DL (ref 31.5–35.7)
MCV RBC AUTO: 90.2 FL (ref 79–97)
MONOCYTES # BLD AUTO: 0.53 10*3/MM3 (ref 0.1–0.9)
MONOCYTES NFR BLD AUTO: 9.9 % (ref 5–12)
NEUTROPHILS NFR BLD AUTO: 2.94 10*3/MM3 (ref 1.7–7)
NEUTROPHILS NFR BLD AUTO: 55 % (ref 42.7–76)
NRBC BLD AUTO-RTO: 0 /100 WBC (ref 0–0.2)
PLATELET # BLD AUTO: 176 10*3/MM3 (ref 140–450)
PMV BLD AUTO: 11.6 FL (ref 6–12)
RBC # BLD AUTO: 3.86 10*6/MM3 (ref 4.14–5.8)
TIBC SERPL-MCNC: 542 MCG/DL (ref 298–536)
TRANSFERRIN SERPL-MCNC: 364 MG/DL (ref 200–360)
WBC NRBC COR # BLD AUTO: 5.35 10*3/MM3 (ref 3.4–10.8)

## 2025-07-07 PROCEDURE — 84466 ASSAY OF TRANSFERRIN: CPT

## 2025-07-07 PROCEDURE — 82728 ASSAY OF FERRITIN: CPT

## 2025-07-07 PROCEDURE — 83540 ASSAY OF IRON: CPT

## 2025-07-07 PROCEDURE — 85025 COMPLETE CBC W/AUTO DIFF WBC: CPT

## 2025-07-07 RX ORDER — FAMOTIDINE 10 MG/ML
20 INJECTION, SOLUTION INTRAVENOUS AS NEEDED
OUTPATIENT
Start: 2025-07-07

## 2025-07-07 RX ORDER — HYDROCORTISONE SODIUM SUCCINATE 100 MG/2ML
100 INJECTION INTRAMUSCULAR; INTRAVENOUS AS NEEDED
OUTPATIENT
Start: 2025-07-07

## 2025-07-07 RX ORDER — DIPHENHYDRAMINE HYDROCHLORIDE 50 MG/ML
50 INJECTION, SOLUTION INTRAMUSCULAR; INTRAVENOUS AS NEEDED
OUTPATIENT
Start: 2025-07-07

## 2025-07-07 RX ORDER — SEMAGLUTIDE 0.68 MG/ML
INJECTION, SOLUTION SUBCUTANEOUS
COMMUNITY

## 2025-07-07 RX ORDER — VENLAFAXINE HYDROCHLORIDE 75 MG/1
75 CAPSULE, EXTENDED RELEASE ORAL DAILY
COMMUNITY
Start: 2025-06-18 | End: 2026-06-18

## 2025-07-07 NOTE — PROGRESS NOTES
DATE:  7/7/2025    DIAGNOSIS: Anemia     REFERRING PHYSICIAN:  No ref. provider found    CHIEF COMPLAINT:  Dizziness, fatigue    HISTORY OF PRESENT ILLNESS:   Iker Mayen is a very pleasant 65 y.o. male who is being seen today at the request of No ref. provider found for evaluation and treatment of anemia.  Mr. Mayen reports following with his PCP routinely every 6 months, with lab work at every other visit.  He reports being aware of his anemia for about the past year.  He has took oral iron in the past which caused extreme diarrhea.  He was given Ferrlecit recently on 12/27/2024.  He states he tolerated this well but did not see much of an improvement in his overall symptoms.  At present, he reports chronic fatigue and dizziness/lightheadedness.  He denies any shortness of breath or palpitations.  Denies any blood loss from any obvious sources.  Denies any known kidney disease.  Denies any chronic aches or pains.  He did recently have an EGD/colonoscopy on 12/20/2024.  These were unremarkable, 1 polyp was removed which showed tubular adenomas.  He does have a history of gastric bypass surgery.  Previous available lab work was reviewed and H&H was normal up until April 2022, except for 1 instance on August 2020 when he had a hemoglobin of 12.3.  Between 4/28/2022 and 3/11/2025 hemoglobin ranged from 8.9-13.2.  Most recently on 3/11/2025 H&H was 9.3/31.2.  Iron panels on 4/22/2021 and 2/22/2022 were normal.  Between 11/13/2024 and 3/11/202 serum iron ranged from 20.5-24, iron sat 3-4%.  The only ferritin that I have to review was on 12/20/2024 and this was 12.04 at that time.      INTERVAL HISTORY:   Mr. Mayen is here today for follow-up of iron deficiency anemia.  He reports doing well since his last visit with us.  He does report an instance where he had to be air flighted to [facility] in late April due to hematemesis x 3.  He was found to have a hernia and a small bowel obstruction.  He has since recovered  and has had no further issues with this since.  He has also had IV iron since seeing him last as below on 4/8, 4/9 and 4/10.  He reports tolerating IV iron well.  He is otherwise without any other new or specific complaints today.  Denies any other obvious signs of bleeding.      TREATMENT HISTORY:           PAST MEDICAL HISTORY:  Past Medical History:   Diagnosis Date    Arthritis     Diabetes mellitus     Prior to weight loss    Disease of thyroid gland     GERD (gastroesophageal reflux disease)     Herniation of lumbar intervertebral disc with compression of cauda equina     Hypertension     Partial paralysis of both lower limbs     sides of legs and feet       PAST SURGICAL HISTORY:  Past Surgical History:   Procedure Laterality Date    ABDOMINAL SURGERY      BACK SURGERY      X4    CHOLECYSTECTOMY      COLONOSCOPY      COLONOSCOPY N/A 12/20/2024    Procedure: COLONOSCOPY with possible biopsy;  Surgeon: Jonn Jenkins MD;  Location: [facility];  Service: Gastroenterology;  Laterality: N/A;    DUPUYTREN CONTRACTURE RELEASE Left 1/4/2024    Procedure: PARTIAL PALMAR FASCIECTOMY LEFT HAND FIFTH FINGER;  Surgeon: Ankush Alexander MD;  Location: [facility];  Service: Orthopedics;  Laterality: Left;    ENDOSCOPY      ENDOSCOPY N/A 12/20/2024    Procedure: ESOPHAGOGASTRODUODENOSCOPY, possible biopsy or dilation;  Surgeon: Jonn Jenkins MD;  Location: [facility];  Service: Gastroenterology;  Laterality: N/A;    HERNIA REPAIR      REBEKAH-[id number]      TONSILLECTOMY         FAMILY HISTORY:  Family History   Problem Relation Age of Onset    Diabetes Mother     Cancer Father         Kidney    Kidney disease Father        SOCIAL HISTORY:  Social History     Socioeconomic History    Marital status:    Tobacco Use    Smoking status: Former     Types: Cigarettes    Smokeless tobacco: Never   Vaping Use    Vaping status: Never Used   Substance and Sexual Activity    Alcohol use: Not Currently    Drug use: Never     Sexual activity: Yes     Partners: Female       MEDICATIONS:  The current medication list was reviewed in the EMR    Current Outpatient Medications:     aspirin 81 MG EC tablet, Take 1 tablet by mouth Daily. ON HOLD FOR SURGERY, Disp: , Rfl:     carvedilol (COREG) 6.25 MG tablet, Take 1 tablet by mouth 2 (Two) Times a Day., Disp: , Rfl:     gabapentin (NEURONTIN) 100 MG capsule, Take 1 capsule by mouth Every Night., Disp: , Rfl:     levothyroxine (SYNTHROID, LEVOTHROID) 50 MCG tablet, Take 1 tablet by mouth Daily., Disp: , Rfl:     losartan-hydrochlorothiazide (HYZAAR) 100-12.5 MG per tablet, take 1 tablet by mouth 1 time each day, Disp: , Rfl:     omeprazole (priLOSEC) 20 MG capsule, , Disp: , Rfl:     Ozempic, 0.25 or 0.5 MG/DOSE, 2 MG/3ML solution pen-injector, INJECT 0.5MG UNDER THE SKIN ONCE EVERY WEEK AS DIRECTED, Disp: , Rfl:     polyethylene glycol (MIRALAX) 17 g packet, Take 17 g by mouth Daily., Disp: 30 each, Rfl: 1    traZODone (DESYREL) 100 MG tablet, Take 1 tablet by mouth Daily., Disp: , Rfl:     venlafaxine XR (EFFEXOR-XR) 75 MG 24 hr capsule, Take 1 capsule by mouth Daily., Disp: , Rfl:     sertraline (ZOLOFT) 50 MG tablet, Take 2 tablets by mouth Daily. (Patient not taking: Reported on 7/7/2025), Disp: , Rfl:     Current Facility-Administered Medications:     ferric gluconate (FERRLECIT) 62.5 mg in sodium chloride 0.9 % 100 mL IVPB, 62.5 mg, Intravenous, Once, Jonn Jenkins MD    ALLERGIES:    Allergies   Allergen Reactions    Adhesive Tape Rash    Latex Unknown - Low Severity and Other (See Comments)     Latex tape, blisters on skin    Wound Dressing Adhesive Other (See Comments)         REVIEW OF SYSTEMS:    A comprehensive 14 point review of systems was performed.  Significant findings as mentioned above.  All other systems reviewed and are negative.      ECOG score: 0           Physical Exam   Vital Signs: /64   Pulse 75   Temp 97.1 °F (36.2 °C) (Temporal)   Resp 18   Ht 172.7 cm  "(68\")   Wt 70.5 kg (155 lb 6.4 oz)   SpO2 98%   BMI 23.63 kg/m²      General: Well developed, well nourished, alert and oriented x 3, in no acute distress.   Head: ATNC   Eyes: PERRL, No evidence of conjunctivitis.   Nose: No nasal discharge.   Mouth: Oral mucosal membranes moist. No oral ulceration or hemorrhages.   Neck: Neck supple. No thyromegaly. No JVD.   Lungs: Clear in all fields to A&P. No rhonchi.    Heart: S1, S2. Regular rate and rhythm. No murmurs, rubs, or gallops.   Abdomen: Soft. Bowel sounds are normoactive. Nontender with palpation. No Hepatosplenomegaly can be appreciated.   Extremities: No cyanosis or edema. Peripheral pulses palpable and equal bilaterally.   Integumentary: No rash, sores, erythema or nodules. No blistering, bruising, or dry skin.   Neurologic: Grossly non-focal exam    Pain Score:  Pain Score    07/07/25 1424   PainSc: 0-No pain         PHQ-Score Total:  PHQ-9 Total Score:         PATHOLOGY:        ENDOSCOPY:                      IMAGING:  No Images in the past 120 days found..     Previous Labs:     3/11/25                      11/13/24        3/27/24      12/21/22      11/17/22        4/30/22      4/29/22      4/28/22        4/27/22        2/22/22            4/22/21              10/23/20        8/31/20        WORK UP (3/24/25):     Lab Results   Component Value Date     WBC 5.49 03/24/2025     HGB 9.1 (L) 03/24/2025     HCT 31.6 (L) 03/24/2025     MCV 78.0 (L) 03/24/2025     RDW 17.6 (H) 03/24/2025      03/24/2025     NEUTRORELPCT 60.8 03/24/2025     LYMPHORELPCT 23.0 03/24/2025     MONORELPCT 8.2 03/24/2025     EOSRELPCT 6.7 (H) 03/24/2025     BASORELPCT 1.1 03/24/2025     NEUTROABS 3.34 03/24/2025     LYMPHSABS 1.26 03/24/2025     Lab Results   Component Value Date      03/24/2025     K 4.2 03/24/2025     CO2 24.6 03/24/2025      03/24/2025     BUN 18 03/24/2025     CREATININE 1.01 03/24/2025     GLUCOSE 95 03/24/2025     CALCIUM 8.9 03/24/2025     " ALKPHOS 103 03/24/2025     AST 29 03/24/2025     ALT 24 03/24/2025     BILITOT 0.3 03/24/2025     ALBUMIN 3.9 03/24/2025     PROTEINTOT 6.7 03/24/2025             Lab Results   Component Value Date    FERRITIN 12.14 (L) 03/24/2025    IRON 23 (L) 03/24/2025    TIBC 632 (H) 03/24/2025    LABIRON 4 (L) 03/24/2025    [id number] 384 03/24/2025    FOLATE 14.40 03/24/2025     Lab Results   Component Value Date/Time     03/24/2025 08:54 AM     C-Reactive Protein <0.30     Reticulocyte % 0.45 Low    Reticulocyte Absolute 0.0182 Low      Sed Rate 19     TSH 2.670     Haptoglobin 179     Copper 74     Zinc 65     Tissue Transglutaminase IgA <2               RECENT LABS:  Lab Results   Component Value Date    WBC 5.35 07/07/2025    HGB 10.5 (L) 07/07/2025    HCT 34.8 (L) 07/07/2025    MCV 90.2 07/07/2025    RDW 16.9 (H) 07/07/2025     07/07/2025    NEUTRORELPCT 55.0 07/07/2025    LYMPHORELPCT 28.2 07/07/2025    MONORELPCT 9.9 07/07/2025    EOSRELPCT 5.6 07/07/2025    BASORELPCT 1.1 07/07/2025    NEUTROABS 2.94 07/07/2025    LYMPHSABS 1.51 07/07/2025     Lab Results   Component Value Date     04/27/2025    K 3.2 (L) 04/26/2025    CO2 24.6 03/24/2025     04/27/2025    BUN 18 03/24/2025    CREATININE 1.01 03/24/2025    GLUCOSE 126 (H) 04/27/2025    CALCIUM 8.9 03/24/2025    ALKPHOS 103 03/24/2025    AST 29 03/24/2025    ALT 24 03/24/2025    BILITOT 0.3 03/24/2025    ALBUMIN 3.9 03/24/2025    PROTEINTOT 6.7 03/24/2025    MG 1.7 (L) 04/28/2025     Lab Results   Component Value Date/Time     03/24/2025 08:54 AM     C-Reactive Protein <0.30     Reticulocyte % 0.45 Low    Reticulocyte Absolute 0.0182 Low      Sed Rate 19     TSH 2.670     Lab Results   Component Value Date    FERRITIN 16.30 (L) 07/07/2025    IRON 48 (L) 07/07/2025    TIBC 542 (H) 07/07/2025    LABIRON 9 (L) 07/07/2025    [id number] 384 03/24/2025    FOLATE 14.40 03/24/2025         ASSESSMENT & PLAN:  Iker Mayen is a very pleasant  65 y.o. male with    Iron Deficiency Anemia   -Previous available lab work was reviewed and H&H was normal up until April 2022, except for 1 instance on August 2020 when he had a hemoglobin of 12.3.  Between 4/28/2022 and 3/11/2025 hemoglobin ranged from 8.9-13.2.  Most recently on 3/11/2025 H&H was 9.3/31.2.  Iron panels on 4/22/2021 and 2/22/2022 were normal.  Between 11/13/2024 and 3/11/202 serum iron ranged from 20.5-24, iron sat 3-4%.  The only ferritin that I have to review was on 12/20/2024 and this was 12.04 at that time.  -Reports oral iron causes diarrhea. He received Ferrlecit on 12/27/2024.  He states he tolerated this well but did not see much of an improvement in his overall symptoms.    -At present, he reports chronic fatigue and dizziness/lightheadedness.  He denies any shortness of breath or palpitations.  Denies any blood loss from any obvious sources.  Denies any known kidney disease.  Denies any chronic aches or pains.  -EGD/colonoscopy on 12/20/2024 was unremarkable, 1 polyp was removed which showed tubular adenomas.    -Obtained repeat CBC at consultation which showed hemoglobin of 9.1, hematocrit 31.6.  WBCs and platelets are normal.  Iron panel/ferritin showed low serum iron of 23, low iron sat of 4% and ferritin of 12.14.  Therefore, we replaced him with IV Venofer on 4/8, 4/9, and 4/10. He tolerated this well.    -Obtained additional labs to further evaluate anemia including CMP, B12, folate, reticulocytes, haptoglobin, LDH, ESR, CRP, TSH, copper, zinc and tissue transglutaminase.  Workup was unremarkable.  - CBC today with H&H of 10.5/34.8.  Iron panel is slightly improved with serum iron of 48, iron sat 9% and ferritin of 16.30.  All levels still low, but improving.  However, we will go ahead and replace him with IV iron again pending insurance approval.  -Will plan to follow-up in about 3 months after replacement with repeat labs.  In the meantime, we will notify patient if any further  intervention is needed.        ACO / JOANIE/Other  Quality measures  -  Iker Mayen did not receive 2024 flu vaccine.  This was recommended.  -  Iker Mayen reports a pain score of 0.    -  Current outpatient and discharge medications have been reconciled for the patient.  Reviewed by: KRYSTAL Correa          The patient was in agreement with the plan and all questions were answered to his satisfaction.     Thank you so much for allowing us to participate in the care of Iker Mayen . Please do not hesitate to contact us with any questions or concerns.     A total of 30 minutes were spent coordinating this patient’s care in clinic today; more than 50% of this time was face-to-face with the patient, reviewing his interim medical history and counseling on the current treatment and followup plan. All questions were answered to his satisfaction.      Electronically Signed by: KRYSTAL Correa , July 7, 2025 15:41 EDT       CC:   No ref. provider found  Tri Espitia DO

## 2025-07-07 NOTE — PROGRESS NOTES
Venipuncture Blood Specimen Collection  Venipuncture performed in right arm by Cristal Chowdhury MA with good hemostasis. Patient tolerated the procedure well without complications.   07/07/25   Cristal Chowdhury MA

## 2025-07-21 ENCOUNTER — INFUSION (OUTPATIENT)
Dept: ONCOLOGY | Facility: HOSPITAL | Age: 65
End: 2025-07-21
Payer: MEDICARE

## 2025-07-21 VITALS
OXYGEN SATURATION: 99 % | SYSTOLIC BLOOD PRESSURE: 97 MMHG | TEMPERATURE: 97 F | HEART RATE: 75 BPM | DIASTOLIC BLOOD PRESSURE: 48 MMHG | BODY MASS INDEX: 23.13 KG/M2 | RESPIRATION RATE: 18 BRPM | WEIGHT: 152.1 LBS

## 2025-07-21 DIAGNOSIS — K90.9 MALABSORPTION OF IRON: ICD-10-CM

## 2025-07-21 DIAGNOSIS — D50.9 IRON DEFICIENCY ANEMIA, UNSPECIFIED IRON DEFICIENCY ANEMIA TYPE: Primary | ICD-10-CM

## 2025-07-21 PROCEDURE — 25010000002 IRON SUCROSE PER 1 MG

## 2025-07-21 PROCEDURE — 96366 THER/PROPH/DIAG IV INF ADDON: CPT

## 2025-07-21 PROCEDURE — 96365 THER/PROPH/DIAG IV INF INIT: CPT

## 2025-07-21 PROCEDURE — 96375 TX/PRO/DX INJ NEW DRUG ADDON: CPT

## 2025-07-21 PROCEDURE — 25010000002 METHYLPREDNISOLONE PER 125 MG

## 2025-07-21 PROCEDURE — 25810000003 SODIUM CHLORIDE 0.9 % SOLUTION 250 ML FLEX CONT

## 2025-07-21 RX ADMIN — IRON SUCROSE 300 MG: 20 INJECTION, SOLUTION INTRAVENOUS at 11:14

## 2025-07-21 RX ADMIN — METHYLPREDNISOLONE SODIUM SUCCINATE 125 MG: 125 INJECTION INTRAMUSCULAR; INTRAVENOUS at 11:12

## 2025-07-22 ENCOUNTER — INFUSION (OUTPATIENT)
Dept: ONCOLOGY | Facility: HOSPITAL | Age: 65
End: 2025-07-22
Payer: MEDICARE

## 2025-07-22 VITALS
RESPIRATION RATE: 18 BRPM | DIASTOLIC BLOOD PRESSURE: 57 MMHG | SYSTOLIC BLOOD PRESSURE: 105 MMHG | HEART RATE: 72 BPM | WEIGHT: 153.1 LBS | OXYGEN SATURATION: 97 % | BODY MASS INDEX: 23.28 KG/M2 | TEMPERATURE: 97.8 F

## 2025-07-22 DIAGNOSIS — K90.9 MALABSORPTION OF IRON: ICD-10-CM

## 2025-07-22 DIAGNOSIS — D50.9 IRON DEFICIENCY ANEMIA, UNSPECIFIED IRON DEFICIENCY ANEMIA TYPE: Primary | ICD-10-CM

## 2025-07-22 PROCEDURE — 25010000002 IRON SUCROSE PER 1 MG

## 2025-07-22 PROCEDURE — 96365 THER/PROPH/DIAG IV INF INIT: CPT

## 2025-07-22 PROCEDURE — 96366 THER/PROPH/DIAG IV INF ADDON: CPT

## 2025-07-22 PROCEDURE — 25810000003 SODIUM CHLORIDE 0.9 % SOLUTION 250 ML FLEX CONT

## 2025-07-22 RX ADMIN — SODIUM CHLORIDE 300 MG: 9 INJECTION, SOLUTION INTRAVENOUS at 11:02

## 2025-07-23 ENCOUNTER — INFUSION (OUTPATIENT)
Dept: ONCOLOGY | Facility: HOSPITAL | Age: 65
End: 2025-07-23
Payer: MEDICARE

## 2025-07-23 VITALS
WEIGHT: 150 LBS | SYSTOLIC BLOOD PRESSURE: 129 MMHG | TEMPERATURE: 97.7 F | OXYGEN SATURATION: 99 % | DIASTOLIC BLOOD PRESSURE: 61 MMHG | RESPIRATION RATE: 18 BRPM | BODY MASS INDEX: 22.81 KG/M2 | HEART RATE: 66 BPM

## 2025-07-23 DIAGNOSIS — D50.9 IRON DEFICIENCY ANEMIA, UNSPECIFIED IRON DEFICIENCY ANEMIA TYPE: Primary | ICD-10-CM

## 2025-07-23 DIAGNOSIS — K90.9 MALABSORPTION OF IRON: ICD-10-CM

## 2025-07-23 PROCEDURE — 25810000003 SODIUM CHLORIDE 0.9 % SOLUTION 250 ML FLEX CONT

## 2025-07-23 PROCEDURE — 96366 THER/PROPH/DIAG IV INF ADDON: CPT

## 2025-07-23 PROCEDURE — 96365 THER/PROPH/DIAG IV INF INIT: CPT

## 2025-07-23 PROCEDURE — 25010000002 IRON SUCROSE PER 1 MG

## 2025-07-23 RX ADMIN — SODIUM CHLORIDE 300 MG: 9 INJECTION, SOLUTION INTRAVENOUS at 11:02

## (undated) DEVICE — SNAR POLYP CAPTIFLX MICRO OVL 13MM 240CM

## (undated) DEVICE — GLV SURG TRIUMPH MICRO PF LTX 8 STRL

## (undated) DEVICE — SPLNT ORTHOGLASS 3INX15FT SN

## (undated) DEVICE — HOLDER: Brand: DEROYAL

## (undated) DEVICE — DISPOSABLE TOURNIQUET CUFF SINGLE BLADDER, SINGLE PORT AND QUICK CONNECT CONNECTOR: Brand: COLOR CUFF

## (undated) DEVICE — BNDG ELAS ELITE V/CLOSE 3IN 5YD LF STRL

## (undated) DEVICE — VESSEL LOOPS X-RAY DETECTABLE: Brand: DEROYAL

## (undated) DEVICE — CONN Y IRR DISP 1P/U

## (undated) DEVICE — PENCL ES MEGADINE EZ/CLEAN BUTN W/HOLSTR 10FT

## (undated) DEVICE — Device

## (undated) DEVICE — SUT ETHLN 3/0 FS1 663G

## (undated) DEVICE — BNDG ELAS MATRX  2IN 5YD LF STRL

## (undated) DEVICE — UNDERCAST PADDING: Brand: DEROYAL

## (undated) DEVICE — Device: Brand: DEFENDO AIR/WATER/SUCTION AND BIOPSY VALVE

## (undated) DEVICE — POLYP TRAP: Brand: TRAPEASE®

## (undated) DEVICE — THE BITE BLOCK MAXI, LATEX FREE STRAP IS USED TO PROTECT THE ENDOSCOPE INSERTION TUBE FROM BEING BITTEN BY THE PATIENT.

## (undated) DEVICE — SPNG GZ WOVN 4X4IN 12PLY 10/BX STRL

## (undated) DEVICE — PK EXTREM UPPR 70

## (undated) DEVICE — BNDG ELAS CO-FLEX SLF ADHR 4IN5YD LF STRL

## (undated) DEVICE — ENDOGATOR AUXILIARY WATER JET CONNECTOR: Brand: ENDOGATOR